# Patient Record
Sex: FEMALE | Race: WHITE | NOT HISPANIC OR LATINO | Employment: OTHER | URBAN - METROPOLITAN AREA
[De-identification: names, ages, dates, MRNs, and addresses within clinical notes are randomized per-mention and may not be internally consistent; named-entity substitution may affect disease eponyms.]

---

## 2017-01-04 ENCOUNTER — HOSPITAL ENCOUNTER (OUTPATIENT)
Dept: MAMMOGRAPHY | Facility: CLINIC | Age: 72
Discharge: HOME | End: 2017-01-04
Attending: OBSTETRICS & GYNECOLOGY | Admitting: OBSTETRICS & GYNECOLOGY
Payer: MEDICARE

## 2017-01-04 DIAGNOSIS — Z12.31 VISIT FOR SCREENING MAMMOGRAM: ICD-10-CM

## 2017-01-04 PROCEDURE — 77063 BREAST TOMOSYNTHESIS BI: CPT

## 2017-01-06 ENCOUNTER — THERAPY VISIT (OUTPATIENT)
Dept: PHYSICAL THERAPY | Facility: CLINIC | Age: 72
End: 2017-01-06
Payer: MEDICARE

## 2017-01-06 DIAGNOSIS — M25.561 ACUTE PAIN OF RIGHT KNEE: Primary | ICD-10-CM

## 2017-01-06 DIAGNOSIS — Z47.89 ORTHOPEDIC AFTERCARE: ICD-10-CM

## 2017-01-06 PROCEDURE — 97140 MANUAL THERAPY 1/> REGIONS: CPT | Mod: GP | Performed by: PHYSICAL THERAPIST

## 2017-01-06 PROCEDURE — 97110 THERAPEUTIC EXERCISES: CPT | Mod: GP | Performed by: PHYSICAL THERAPIST

## 2017-01-13 ENCOUNTER — THERAPY VISIT (OUTPATIENT)
Dept: PHYSICAL THERAPY | Facility: CLINIC | Age: 72
End: 2017-01-13
Payer: MEDICARE

## 2017-01-13 DIAGNOSIS — M25.561 ACUTE PAIN OF RIGHT KNEE: Primary | ICD-10-CM

## 2017-01-13 DIAGNOSIS — Z47.89 ORTHOPEDIC AFTERCARE: ICD-10-CM

## 2017-01-13 PROCEDURE — 97140 MANUAL THERAPY 1/> REGIONS: CPT | Mod: GP | Performed by: PHYSICAL THERAPIST

## 2017-01-13 PROCEDURE — 97110 THERAPEUTIC EXERCISES: CPT | Mod: GP | Performed by: PHYSICAL THERAPIST

## 2017-01-13 NOTE — PROGRESS NOTES
Subjective:    HPI                    Objective:    System    Physical Exam    General     ROS    Assessment/Plan:      PROGRESS  REPORT    Progress reporting period is from 11/29/16 to 1/13/16.       SUBJECTIVE  Subjective changes noted by patient:  .  Subjective: Underwent doppler post last tx that was (-).  Is having less pain in anterior knee and has been able to transfer to [facility] more during gait.      Current pain level is   .     Previous pain level was   Initial Pain level: 2/10.   Changes in function:  Yes (See Goal flowsheet attached for changes in current functional level)  Adverse reaction to treatment or activity: None    OBJECTIVE  Changes noted in objective findings:    Objective: PROM:  3/0/125.  Working now more on strength-utilizing CKC ex with precautions to avoid irritation of LB.       ASSESSMENT/PLAN  Updated problem list and treatment plan: Diagnosis 1:  R TKA  Pain -  manual therapy, self management, education and home program  Decreased ROM/flexibility - manual therapy and therapeutic exercise  Decreased joint mobility - manual therapy and therapeutic exercise  Decreased strength - therapeutic exercise and therapeutic activities  Impaired gait - gait training  Impaired muscle performance - neuro re-education  Decreased function - therapeutic activities  STG/LTGs have been met or progress has been made towards goals:  Yes (See Goal flow sheet completed today.)  Assessment of Progress: The patient's condition is improving.  Self Management Plans:  Patient has been instructed in a home treatment program.  Patient  has been instructed in self management of symptoms.    Rebecca continues to require the following intervention to meet STG and LTG's:  PT    Recommendations:  This patient would benefit from continued therapy.     Frequency:  1 X week, once daily  Duration:  for 7 weeks        Please refer to the daily flowsheet for treatment today, total treatment time and time spent performing 1:1  timed codes.

## 2017-01-13 NOTE — Clinical Note
[facility] PHYSICAL [facility]  [address]  [facility] [address]  [phone]    2017  Re: Rebecca Prieto   :   1945  MRN:  [id number]   REFERRING PHYSICIAN:   Jean Wallace    [facility] PHYSICAL [facility]    Date of Initial Evaluation:  2016  Visits: 11 Rxs Used: 11  Reason for Referral:     Acute pain of right knee  Orthopedic aftercare    PROGRESS  REPORT    Progress reporting period is from 16 to 17.       SUBJECTIVE  Subjective changes noted by patient:  .  Subjective: Underwent doppler post last tx that was (-).  Is having less pain in anterior knee and has been able to transfer to [facility] more during gait.      Current pain level is   .     Previous pain level was   Initial Pain level: 2/10.   Changes in function:  Yes (See Goal flowsheet attached for changes in current functional level)  Adverse reaction to treatment or activity: None    OBJECTIVE  Changes noted in objective findings:    Objective: PROM:  3/0/125.  Working now more on strength-utilizing CKC ex with precautions to avoid irritation of LB.       ASSESSMENT/PLAN  Updated problem list and treatment plan: Diagnosis 1:  R TKA  Pain -  manual therapy, self management, education and home program  Decreased ROM/flexibility - manual therapy and therapeutic exercise  Decreased joint mobility - manual therapy and therapeutic exercise  Decreased strength - therapeutic exercise and therapeutic activities  Impaired gait - gait training  Impaired muscle performance - neuro re-education  Decreased function - therapeutic activities  STG/LTGs have been met or progress has been made towards goals:  Yes (See Goal flow sheet completed today.)  Assessment of Progress: The patient's condition is improving.  Self Management Plans:  Patient has been instructed in a home treatment program.    Re: Rebecca Prieto   :   1945    Patient  has been  instructed in self management of symptoms.  Rebecca continues to require the following intervention to meet STG and LTG's:  PT    Recommendations:  This patient would benefit from continued therapy.     Frequency:  1 X week, once daily  Duration:  for 7 weeks        Thank you for your referral.    INQUIRIES  Therapist: Candy Forbes, PT, ScD, [facility]   [facility]  [address] [place] [address] [address] [address]  Phone: [phone]  Fax: [phone]

## 2017-01-20 ENCOUNTER — THERAPY VISIT (OUTPATIENT)
Dept: PHYSICAL THERAPY | Facility: CLINIC | Age: 72
End: 2017-01-20
Payer: MEDICARE

## 2017-01-20 DIAGNOSIS — M25.561 ACUTE PAIN OF RIGHT KNEE: Primary | ICD-10-CM

## 2017-01-20 DIAGNOSIS — Z47.89 ORTHOPEDIC AFTERCARE: ICD-10-CM

## 2017-01-20 PROCEDURE — 97112 NEUROMUSCULAR REEDUCATION: CPT | Mod: GP | Performed by: PHYSICAL THERAPIST

## 2017-01-20 PROCEDURE — 97110 THERAPEUTIC EXERCISES: CPT | Mod: GP | Performed by: PHYSICAL THERAPIST

## 2017-01-26 ENCOUNTER — DOCUMENTATION ONLY (OUTPATIENT)
Dept: LAB | Facility: CLINIC | Age: 72
End: 2017-01-26

## 2017-01-26 DIAGNOSIS — E55.9 VITAMIN D DEFICIENCY: ICD-10-CM

## 2017-01-26 DIAGNOSIS — Z00.00 ROUTINE GENERAL MEDICAL EXAMINATION AT A HEALTH CARE FACILITY: Primary | ICD-10-CM

## 2017-01-26 DIAGNOSIS — E78.5 HYPERLIPIDEMIA LDL GOAL <160: ICD-10-CM

## 2017-01-26 NOTE — PROGRESS NOTES
Please review, associate diagnosis, and sign pending pre physical lab orders for patient's upcoming 2/2/17 lab appointment.     Thank you,  Lab

## 2017-01-27 ENCOUNTER — THERAPY VISIT (OUTPATIENT)
Dept: PHYSICAL THERAPY | Facility: CLINIC | Age: 72
End: 2017-01-27
Payer: MEDICARE

## 2017-01-27 DIAGNOSIS — Z47.89 ORTHOPEDIC AFTERCARE: ICD-10-CM

## 2017-01-27 DIAGNOSIS — M25.561 ACUTE PAIN OF RIGHT KNEE: Primary | ICD-10-CM

## 2017-01-27 PROCEDURE — 97112 NEUROMUSCULAR REEDUCATION: CPT | Mod: GP | Performed by: PHYSICAL THERAPIST

## 2017-01-27 PROCEDURE — 97110 THERAPEUTIC EXERCISES: CPT | Mod: GP | Performed by: PHYSICAL THERAPIST

## 2017-02-02 DIAGNOSIS — Z00.00 ROUTINE GENERAL MEDICAL EXAMINATION AT A HEALTH CARE FACILITY: ICD-10-CM

## 2017-02-02 DIAGNOSIS — E55.9 VITAMIN D DEFICIENCY: ICD-10-CM

## 2017-02-02 DIAGNOSIS — E78.5 HYPERLIPIDEMIA LDL GOAL <160: ICD-10-CM

## 2017-02-02 LAB
ALBUMIN SERPL-MCNC: 3.7 G/DL (ref 3.4–5)
ALP SERPL-CCNC: 57 U/L (ref 40–150)
ALT SERPL W P-5'-P-CCNC: 19 U/L (ref 0–50)
ANION GAP SERPL CALCULATED.3IONS-SCNC: 7 MMOL/L (ref 3–14)
AST SERPL W P-5'-P-CCNC: 11 U/L (ref 0–45)
BILIRUB SERPL-MCNC: 0.4 MG/DL (ref 0.2–1.3)
BUN SERPL-MCNC: 18 MG/DL (ref 7–30)
CALCIUM SERPL-MCNC: 9 MG/DL (ref 8.5–10.1)
CHLORIDE SERPL-SCNC: 104 MMOL/L (ref 94–109)
CHOLEST SERPL-MCNC: 240 MG/DL
CO2 SERPL-SCNC: 30 MMOL/L (ref 20–32)
CREAT SERPL-MCNC: 0.71 MG/DL (ref 0.52–1.04)
DEPRECATED CALCIDIOL+CALCIFEROL SERPL-MC: 30 UG/L (ref 20–75)
ERYTHROCYTE [DISTWIDTH] IN BLOOD BY AUTOMATED COUNT: 13.8 % (ref 10–15)
GFR SERPL CREATININE-BSD FRML MDRD: 82 ML/MIN/1.7M2
GLUCOSE SERPL-MCNC: 87 MG/DL (ref 70–99)
HCT VFR BLD AUTO: 38.5 % (ref 35–47)
HCV AB SERPL QL IA: NORMAL
HDLC SERPL-MCNC: 80 MG/DL
HGB BLD-MCNC: 12 G/DL (ref 11.7–15.7)
LDLC SERPL CALC-MCNC: 149 MG/DL
MCH RBC QN AUTO: 28.2 PG (ref 26.5–33)
MCHC RBC AUTO-ENTMCNC: 31.2 G/DL (ref 31.5–36.5)
MCV RBC AUTO: 90 FL (ref 78–100)
NONHDLC SERPL-MCNC: 160 MG/DL
PLATELET # BLD AUTO: 335 10E9/L (ref 150–450)
POTASSIUM SERPL-SCNC: 4 MMOL/L (ref 3.4–5.3)
PROT SERPL-MCNC: 6.7 G/DL (ref 6.8–8.8)
RBC # BLD AUTO: 4.26 10E12/L (ref 3.8–5.2)
SODIUM SERPL-SCNC: 141 MMOL/L (ref 133–144)
TRIGL SERPL-MCNC: 56 MG/DL
WBC # BLD AUTO: 4.7 10E9/L (ref 4–11)

## 2017-02-02 PROCEDURE — 85027 COMPLETE CBC AUTOMATED: CPT | Performed by: INTERNAL MEDICINE

## 2017-02-02 PROCEDURE — 36415 COLL VENOUS BLD VENIPUNCTURE: CPT | Performed by: INTERNAL MEDICINE

## 2017-02-02 PROCEDURE — 80061 LIPID PANEL: CPT | Performed by: INTERNAL MEDICINE

## 2017-02-02 PROCEDURE — 82306 VITAMIN D 25 HYDROXY: CPT | Performed by: INTERNAL MEDICINE

## 2017-02-02 PROCEDURE — 80053 COMPREHEN METABOLIC PANEL: CPT | Performed by: INTERNAL MEDICINE

## 2017-02-02 PROCEDURE — 86803 HEPATITIS C AB TEST: CPT | Performed by: INTERNAL MEDICINE

## 2017-02-02 NOTE — Clinical Note
"[facility]  [address] [place], [place] [address]  [address]  [place]  [address]  Tel: [phone]    February 3, 2017    Rebecca R Ida  [facility] [facility]  [place] [address]        Dear Ms. Prieto,    The following letter pertains to your most recent diagnostic tests:    -Your hepatitis C screening test is negative.  You do not have hepatitis C.     -Your vitamin D level is normal.    -Your total cholesterol is 240 which is above your goal of total cholesterol less than 200.  This is because you have very high good cholesterol HDL.      -Your triglycerides are 56 which are at your goal of triglycerides less than 150.    -Your HDL or \"good cholesterol\" is 80 which is at your goal of HDL cholesterol greater than 40.    -Your LDL cholesterol or \"bad cholesterol\" is 149 which is at your goal of LDL cholesterol less than <160.  Your LDL goal is based on your risk factors for artery disease.     -Liver and gallbladder tests are normal for you. (ALT,AST, Alk phos, bilirubin), kidney function is normal for you (Creatinine, GFR), Sodium is normal, Potassium is normal for you, Calcium is normal for you, Glucose (blood sugar) is normal for you.      -Your complete blood counts including your hemoglobin returned normal for you.         Bottom line:  Your lab test results look healthy and at goal.        Follow up:  As previously planned with Dr. Jones       The 10-year ASCVD risk score (Brittmonet COTTON Jr., et al., 2013) is: 6.1%    Values used to calculate the score:      Age: 72 years      Sex: Female      Is an : No      Diabetic: No      Tobacco smoker: No      Systolic Blood Pressure: 102 mmHg      Prescribed Antihypertensives: No      HDL Cholesterol: 80 mg/dL      Total Cholesterol: 240 mg/dL    If you have any further questions or problems, please contact our office.      Sincerely,    González Hurtado MD/sohail    Results for orders placed or performed in visit on 02/02/17   Hepatitis C antibody   Result " Value Ref Range    Hepatitis C Antibody  NR     Nonreactive   Assay performance characteristics have not been established for newborns,   infants, and children     Lipid Profile with reflex to direct LDL   Result Value Ref Range    Cholesterol 240 (H) <200 mg/dL    Triglycerides 56 <150 mg/dL    HDL Cholesterol 80 >49 mg/dL    LDL Cholesterol Calculated 149 (H) <100 mg/dL    Non HDL Cholesterol 160 (H) <130 mg/dL   CBC with platelets   Result Value Ref Range    WBC 4.7 4.0 - 11.0 10e9/L    RBC Count 4.26 3.8 - 5.2 10e12/L    Hemoglobin 12.0 11.7 - 15.7 g/dL    Hematocrit 38.5 35.0 - 47.0 %    MCV 90 78 - 100 fl    MCH 28.2 26.5 - 33.0 pg    MCHC 31.2 (L) 31.5 - 36.5 g/dL    RDW 13.8 10.0 - 15.0 %    Platelet Count 335 150 - 450 10e9/L   Comprehensive metabolic panel   Result Value Ref Range    Sodium 141 133 - 144 mmol/L    Potassium 4.0 3.4 - 5.3 mmol/L    Chloride 104 94 - 109 mmol/L    Carbon Dioxide 30 20 - 32 mmol/L    Anion Gap 7 3 - 14 mmol/L    Glucose 87 70 - 99 mg/dL    Urea Nitrogen 18 7 - 30 mg/dL    Creatinine 0.71 0.52 - 1.04 mg/dL    GFR Estimate 82 >60 mL/min/1.7m2    GFR Estimate If Black >90   GFR Calc   >60 mL/min/1.7m2    Calcium 9.0 8.5 - 10.1 mg/dL    Bilirubin Total 0.4 0.2 - 1.3 mg/dL    Albumin 3.7 3.4 - 5.0 g/dL    Protein Total 6.7 (L) 6.8 - 8.8 g/dL    Alkaline Phosphatase 57 40 - 150 U/L    ALT 19 0 - 50 U/L    AST 11 0 - 45 U/L   Vitamin D Deficiency   Result Value Ref Range    Vitamin D Deficiency screening 30 20 - 75 ug/L           Enclosure: Lab Results

## 2017-02-03 ENCOUNTER — THERAPY VISIT (OUTPATIENT)
Dept: PHYSICAL THERAPY | Facility: CLINIC | Age: 72
End: 2017-02-03
Payer: MEDICARE

## 2017-02-03 DIAGNOSIS — M25.561 ACUTE PAIN OF RIGHT KNEE: Primary | ICD-10-CM

## 2017-02-03 DIAGNOSIS — Z47.89 ORTHOPEDIC AFTERCARE: ICD-10-CM

## 2017-02-03 PROCEDURE — 97112 NEUROMUSCULAR REEDUCATION: CPT | Mod: GP | Performed by: PHYSICAL THERAPIST

## 2017-02-03 PROCEDURE — 97110 THERAPEUTIC EXERCISES: CPT | Mod: GP | Performed by: PHYSICAL THERAPIST

## 2017-02-03 NOTE — PROGRESS NOTES
"Quick Note:    The following letter pertains to your most recent diagnostic tests:    -Your hepatitis C screening test is negative. You do not have hepatitis C.     -Your vitamin D level is normal.    -Your total cholesterol is 240 which is above your goal of total cholesterol less than 200. This is because you have very high good cholesterol HDL.     -Your triglycerides are 56 which are at your goal of triglycerides less than 150.    -Your HDL or \"good cholesterol\" is 80 which is at your goal of HDL cholesterol greater than 40.    -Your LDL cholesterol or \"bad cholesterol\" is 149 which is at your goal of LDL cholesterol less than <160. Your LDL goal is based on your risk factors for artery disease.    -Liver and gallbladder tests are normal for you. (ALT,AST, Alk phos, bilirubin), kidney function is normal for you (Creatinine, GFR), Sodium is normal, Potassium is normal for you, Calcium is normal for you, Glucose (blood sugar) is normal for you.     -Your complete blood counts including your hemoglobin returned normal for you.       Bottom line: Your lab test results look healthy and at goal.       Follow up: As previously planned with Dr. Jones       Sincerely,    Dr. Hurtado    The 10-year ASCVD risk score (Britt COTTON Jr., et al., 2013) is: 6.1%   Values used to calculate the score:   Age: 72 years   Sex: Female   Is an : No   Diabetic: No   Tobacco smoker: No   Systolic Blood Pressure: 102 mmHg   Prescribed Antihypertensives: No   HDL Cholesterol: 80 mg/dL   Total Cholesterol: 240 mg/dL    ______  "

## 2017-02-06 ENCOUNTER — OFFICE VISIT (OUTPATIENT)
Dept: FAMILY MEDICINE | Facility: CLINIC | Age: 72
End: 2017-02-06
Payer: MEDICARE

## 2017-02-06 VITALS
TEMPERATURE: 97.6 F | OXYGEN SATURATION: 99 % | WEIGHT: 149.7 LBS | BODY MASS INDEX: 23.49 KG/M2 | SYSTOLIC BLOOD PRESSURE: 119 MMHG | HEIGHT: 67 IN | HEART RATE: 56 BPM | DIASTOLIC BLOOD PRESSURE: 73 MMHG

## 2017-02-06 DIAGNOSIS — E55.9 VITAMIN D DEFICIENCY: ICD-10-CM

## 2017-02-06 DIAGNOSIS — M48.00 SPINAL STENOSIS, UNSPECIFIED SPINAL REGION: ICD-10-CM

## 2017-02-06 DIAGNOSIS — D12.6 ADENOMATOUS POLYP OF COLON: ICD-10-CM

## 2017-02-06 DIAGNOSIS — G43.809 OTHER TYPE OF MIGRAINE WITHOUT STATUS MIGRAINOSUS: ICD-10-CM

## 2017-02-06 DIAGNOSIS — E78.5 HYPERLIPIDEMIA LDL GOAL <160: ICD-10-CM

## 2017-02-06 DIAGNOSIS — M85.80 OSTEOPENIA: ICD-10-CM

## 2017-02-06 DIAGNOSIS — Z00.00 ROUTINE GENERAL MEDICAL EXAMINATION AT A HEALTH CARE FACILITY: Primary | ICD-10-CM

## 2017-02-06 PROCEDURE — G0439 PPPS, SUBSEQ VISIT: HCPCS | Performed by: INTERNAL MEDICINE

## 2017-02-06 RX ORDER — SUMATRIPTAN 50 MG/1
50 TABLET, FILM COATED ORAL PRN
Qty: 18 TABLET | Refills: 3 | Status: CANCELLED | OUTPATIENT
Start: 2017-02-06

## 2017-02-06 RX ORDER — GABAPENTIN 300 MG/1
300 CAPSULE ORAL 3 TIMES DAILY
Qty: 270 CAPSULE | Refills: 3 | Status: SHIPPED | OUTPATIENT
Start: 2017-02-06 | End: 2018-04-02

## 2017-02-06 RX ORDER — SUMATRIPTAN 50 MG/1
50 TABLET, FILM COATED ORAL PRN
Qty: 18 TABLET | Refills: 3 | Status: SHIPPED | OUTPATIENT
Start: 2017-02-06 | End: 2018-05-08

## 2017-02-06 NOTE — MR AVS SNAPSHOT
After Visit Summary   2/6/2017    Rebecca Prieto    MRN: [id number]           Patient Information     Date Of Birth          1945        Visit Information        Provider Department      2/6/2017 10:30 AM Marco Jones MD [facility]        Today's Diagnoses     Routine general medical examination at a health care facility    -  1     Other type of migraine without status migrainosus         Spinal stenosis, unspecified spinal region         Hyperlipidemia LDL goal <160         Vitamin D deficiency         Osteopenia         Adenomatous polyp of colon         Nausea           Care Instructions      Preventive Health Recommendations    Female Ages 65 +    Yearly exam:     See your health care provider every year in order to  o Review health changes.   o Discuss preventive care.    o Review your medicines if your doctor has prescribed any.      You no longer need a yearly Pap test unless you've had an abnormal Pap test in the past 10 years. If you have vaginal symptoms, such as bleeding or discharge, be sure to talk with your provider about a Pap test.      Every 1 to 2 years, have a mammogram.  If you are over 69, talk with your health care provider about whether or not you want to continue having screening mammograms.      Every 10 years, have a colonoscopy. Or, have a yearly FIT test (stool test). These exams will check for colon cancer.       Have a cholesterol test every 5 years, or more often if your doctor advises it.       Have a diabetes test (fasting glucose) every three years. If you are at risk for diabetes, you should have this test more often.       At age 65, have a bone density scan (DEXA) to check for osteoporosis (brittle bone disease).    Shots:    Get a flu shot each year.    Get a tetanus shot every 10 years.    Talk to your doctor about your pneumonia vaccines. There are now two you should receive - Pneumovax (PPSV 23) and Prevnar (PCV 13).    Talk to your  doctor about the shingles vaccine.    Talk to your doctor about the hepatitis B vaccine.    Nutrition:     Eat at least 5 servings of fruits and vegetables each day.      Eat whole-grain bread, whole-wheat pasta and brown rice instead of white grains and rice.      Talk to your provider about Calcium and Vitamin D.     Lifestyle    Exercise at least 150 minutes a week (30 minutes a day, 5 days a week). This will help you control your weight and prevent disease.      Limit alcohol to one drink per day.      No smoking.       Wear sunscreen to prevent skin cancer.       See your dentist twice a year for an exam and cleaning.      See your eye doctor every 1 to 2 years to screen for conditions such as glaucoma, macular degeneration and cataracts.    The 10-year ASCVD risk score (Britt COTTON JrAnderson, et al., 2013) is: 8.1%    Values used to calculate the score:      Age: 72 years      Sex: Female      Is an : No      Diabetic: No      Tobacco smoker: No      Systolic Blood Pressure: 119 mmHg      Prescribed Antihypertensives: No      HDL Cholesterol: 80 mg/dL      Total Cholesterol: 240 mg/dL          Follow-ups after your visit        Your next 10 appointments already scheduled     Feb 10, 2017 10:40 AM   BISHNU Extremity with Candy Forbes PT   [facility] ([facility]  )    [address] [place] [address] [address]  [facility] [address] [phone]            Feb 16, 2017 10:00 AM   BISHNU Extremity with Candy Forbes PT   [facility] ([facility]  )    [address] [place] [address] [address]  [facility] [address] [phone]            Feb 23, 2017 10:00 AM   BISHNU Extremity with Candy Forbes PT   [facility] [place] [facility] ([facility]  )    [address] [place] [address] [address]  [facility] [address]   [phone]              Who to contact     If you have questions or need follow up information about today's clinic visit or  "your schedule please contact [facility] directly at [phone].  Normal or non-critical lab and imaging results will be communicated to you by MyChart, letter or phone within 4 business days after the clinic has received the results. If you do not hear from us within 7 days, please contact the clinic through MyChart or phone. If you have a critical or abnormal lab result, we will notify you by phone as soon as possible.  Submit refill requests through [employer] or call your pharmacy and they will forward the refill request to us. Please allow 3 business days for your refill to be completed.          Additional Information About Your Visit        Care EveryWhere ID     This is your Care EveryWhere ID. This could be used by other organizations to access your [facility] medical records  [id number]        Your Vitals Were     Pulse Temperature Height BMI (Body Mass Index) Pulse Oximetry Breastfeeding?    56 97.6  F (36.4  C) (Oral) 5' 7\" (1.702 m) 23.44 kg/m2 99% No       Blood Pressure from Last 3 Encounters:   02/06/17 119/73   12/08/16 102/68   11/29/16 105/66    Weight from Last 3 Encounters:   02/06/17 149 lb 11.2 oz (67.903 kg)   12/08/16 144 lb (65.318 kg)   11/29/16 147 lb (66.679 kg)              Today, you had the following     No orders found for display         Today's Medication Changes          These changes are accurate as of: 2/6/17 10:35 AM.  If you have any questions, ask your nurse or doctor.               Stop taking these medicines if you haven't already. Please contact your care team if you have questions.     ADVIL 200 MG tablet   Generic drug:  ibuprofen   Stopped by:  Marco Jones MD           aspirin 325 MG tablet   Stopped by:  Marco Jones MD           Magnesium Oxide 250 MG Tabs   Stopped by:  Marco Jones MD           ondansetron 4 MG tablet   Commonly known as:  ZOFRAN   Stopped by:  Marco Jones MD           prochlorperazine 5 MG tablet "   Commonly known as:  COMPAZINE   Stopped by:  Marco Jones MD           senna-docusate 8.6-50 MG per tablet   Commonly known as:  SENOKOT-S;PERICOLACE   Stopped by:  Marco Jones MD           TYLENOL PO   Stopped by:  Marco Jones MD                Where to get your medicines      These medications were sent to [facility] - [place], [place] - [address]  [address] [facility] [address]     Phone:  [phone]    - gabapentin 300 MG capsule  - SUMAtriptan 50 MG tablet             Primary Care Provider Office Phone # Fax #    Marco Jones [phone]       [facility] [address] [place] [address] [address]        Thank you!     Thank you for choosing [facility]  for your care. Our goal is always to provide you with excellent care. Hearing back from our patients is one way we can continue to improve our services. Please take a few minutes to complete the written survey that you may receive in the mail after your visit with us. Thank you!             Your Updated Medication List - Protect others around you: Learn how to safely use, store and throw away your medicines at www.disposemymeds.org.          This list is accurate as of: 2/6/17 10:35 AM.  Always use your most recent med list.                   Brand Name Dispense Instructions for use    CALCIUM + D PO      Take 1 capsule by mouth daily       conjugated estrogens cream    PREMARIN     Place 1 g vaginally twice a week       gabapentin 300 MG capsule    NEURONTIN    270 capsule    Take 1 capsule (300 mg) by mouth 3 times daily       SUMAtriptan 50 MG tablet    IMITREX    18 tablet    Take 1 tablet (50 mg) by mouth as needed for migraine

## 2017-02-06 NOTE — NURSING NOTE
"Chief Complaint   Patient presents with     Wellness Visit       Initial /73 mmHg  Pulse 56  Temp(Src) 97.6  F (36.4  C) (Oral)  Ht 5' 7\" (1.702 m)  Wt 149 lb 11.2 oz (67.903 kg)  BMI 23.44 kg/m2  SpO2 99%  Breastfeeding? No Estimated body mass index is 23.44 kg/(m^2) as calculated from the following:    Height as of this encounter: 5' 7\" (1.702 m).    Weight as of this encounter: 149 lb 11.2 oz (67.903 kg).  Medication Reconciliation: complete.  Jeanne Viramontes CMA    "

## 2017-02-06 NOTE — PROGRESS NOTES
SUBJECTIVE:                                                            Rebecca Prieto is a 72 year old female who presents for Preventive Visit.    The patient feels fine, is working out some but not a lot.  NO c/o on review of systems, seeing gyn yearly, dexa done via her.  On neurontin for chronic back pain due to spinal stenosis.  Migraines have been stable.                 Past Medical History:      Past Medical History   Diagnosis Date     Spinal stenosis      on neurontin     Spondylolistheses      Migraines      Osteopenia 2005     fu 2009 better, -0.4 spine and -1.1 fem neck, has fu with gyn     Tonsillar abscess 1992     Adenomatous polyp of colon 2002     fu done 2011 and to fu 2014, fu done 2014 and to fu 2019     Screening 2005     abd us neg for aaa due to fh     Hypercholesteraemia      Vitamin D deficiency 2014     PONV (postoperative nausea and vomiting)              Past Surgical History:      Past Surgical History   Procedure Laterality Date     Strip vein  2010     Hysterectomy, pap no longer indicated  1998     due to hyperplasia     Right knee arthroscopy  1998     Breast biopsies       twice     Rotator cuff repair rt/lt  2007     Right knee replacement  2016     [employer]             Social History:     Social History     Social History     Marital Status:      Spouse Name: N/A     Number of Children: 3     Years of Education: N/A     Occupational History     Not on file.     Social History Main Topics     Smoking status: Never Smoker      Smokeless tobacco: Never Used     Alcohol Use: 4.2 oz/week     7 Standard drinks or equivalent per week     Drug Use: No     Sexual Activity:     Partners: Male     Other Topics Concern     Not on file     Social History Narrative             Family History:   reviewed         Allergies:     Allergies   Allergen Reactions     Nickel      Succinylcholine      Gets paralyzed             Medications:     Current Outpatient Prescriptions   Medication  "Sig Dispense Refill     gabapentin (NEURONTIN) 300 MG capsule Take 1 capsule (300 mg) by mouth 3 times daily 270 capsule 3     SUMAtriptan (IMITREX) 50 MG tablet Take 1 tablet (50 mg) by mouth as needed for migraine 18 tablet 3     conjugated estrogens (PREMARIN) vaginal cream Place 1 g vaginally twice a week       Calcium Carbonate-Vitamin D (CALCIUM + D PO) Take 1 capsule by mouth daily        [DISCONTINUED] gabapentin (NEURONTIN) 300 MG capsule Take 1 capsule (300 mg) by mouth 3 times daily 270 capsule 3               Review of Systems:   The 10 point Review of Systems is negative other than noted in the HPI           Physical Exam:   Blood pressure 119/73, pulse 56, temperature 97.6  F (36.4  C), temperature source Oral, height 5' 7\" (1.702 m), weight 149 lb 11.2 oz (67.903 kg), SpO2 99 %, not currently breastfeeding.    Exam:  Constitutional: healthy appearing, alert and in no distress  Heent: Normocephalic. Head without obvious masses or lesions. PERRLDC, EOMI. Mouth exam within normal limits: tongue, mucous membranes, posterior pharynx all normal, no lesions or abnormalities seen.  Tm's and canals within normal limits bilaterally. Neck supple, no nuchal rigidity or masses. No supraclavicular, or cervical adenopathy. Thyroid symmetric, no masses.  Cardiovascular: Regular rate and rhythm, no murmer, rub or gallops.  JVP not elevated, no edema.  Carotids within normal limits bilaterally, no bruits.  Respiratory: Normal respiratory effort.  Lungs clear, normal flow, no wheezing or crackles.  Gastrointestinal: Normal active bowel sounds.   Soft, not tender, no masses, guarding or rebound.  No hepatosplenomegaly.   Musculoskeletal: extremities normal, no gross deformities noted.  Skin: no suspicious lesions or rashes   Neurologic: Mental status within normal limits.  Speech fluent.  No gross motor abnormalities and gait intact.  Psychiatric: mentation appears normal and affect normal.         Data:   Labs reviewed " with patient         Assessment:   1. Normal cpx  2. Spinal stenosis, migraines, stable  3. Elevated cholesterol, 10 year risk under 10%, diet, exercise  4. Vit d defic, level fine  5. Colon polyp, up to date on follow up  6. hcm         Plan:   Up to date immunizations, colon, mammogram  Follow up gyn  Exercise, diet  Call if problems      Marco Jones M.D.                  Are you in the first 12 months of your Medicare Part B coverage?  No    Healthy Habits:    Do you get at least three servings of calcium containing foods daily (dairy, green leafy vegetables, etc.)? yes    Amount of exercise or daily activities, outside of work: 5 day(s) per week    Problems taking medications regularly No    Medication side effects: No    Have you had an eye exam in the past two years? yes    Do you see a dentist twice per year? yes    Do you have sleep apnea, excessive snoring or daytime drowsiness?no                All Histories reviewed and updated in [facility] as appropriate.  Social History   Substance Use Topics     Smoking status: Never Smoker      Smokeless tobacco: Never Used     Alcohol Use: 4.2 oz/week     7 Standard drinks or equivalent per week       The patient does not drink >3 drinks per day nor >7 drinks per week.    Today's PHQ-2 Score:   PHQ-2 ( 1999 Pf[employer]) 11/10/2016 2/4/2016   Q1: Little interest or pleasure in doing things 0 0   Q2: Feeling down, depressed or hopeless 0 0   PHQ-2 Score 0 0       Do you feel safe in your environment - Yes    Do you have a Health Care Directive?: No: Advance care planning reviewed with patient; information given to patient to review.    Current providers sharing in care for this patient include:   Patient Care Team:  Marco Jones MD as PCP - General (Internal Medicine)  Sandra Hector MD as MD (OB/Gyn)      Hearing impairment: No    Ability to successfully perform activities of daily living: Yes, no assistance needed     Fall risk:       Home safety:  none  "identified      The following health maintenance items are reviewed in Epic and correct as of today:  Health Maintenance   Topic Date Due     URINE DRUG SCREEN Q1 YR  02/01/1962     ADVANCE DIRECTIVE PLANNING Q5 YRS (NO INBASKET)  02/01/1965     DEXA SCAN SCREENING (SYSTEM ASSIGNED)  02/01/2012     FALL RISK ASSESSMENT  02/04/2017     INFLUENZA VACCINE (SYSTEM ASSIGNED)  09/01/2017     ELIER QUESTIONNAIRE 1 YEAR  11/29/2017     PHQ-9 Q1YR (NO INBASKET)  11/29/2017     MAMMO SCREEN Q2 YR (SYSTEM ASSIGNED)  01/04/2019     LIPID SCREEN Q5 YR FEMALE (SYSTEM ASSIGNED)  02/02/2022     TETANUS IMMUNIZATION (SYSTEM ASSIGNED)  10/11/2022     COLON CANCER SCREEN (SYSTEM ASSIGNED)  10/30/2024     PNEUMOCOCCAL  Completed     HEPATITIS C SCREENING  Completed            End of Life Planning:  Patient currently has an advanced directive: No.  I have verified the patient's ablity to prepare an advanced directive/make health care decisions.  Literature was provided to assist patient in preparing an advanced directive.    COUNSELING:  Reviewed preventive health counseling, as reflected in patient instructions       Regular exercise       Healthy diet/nutrition        Estimated body mass index is 22.55 kg/(m^2) as calculated from the following:    Height as of 12/8/16: 5' 7\" (1.702 m).    Weight as of 12/8/16: 144 lb (65.318 kg).     reports that she has never smoked. She has never used smokeless tobacco.      Appropriate preventive services were discussed with this patient, including applicable screening as appropriate for cardiovascular disease, diabetes, osteopenia/osteoporosis, and glaucoma.  As appropriate for age/gender, discussed screening for colorectal cancer, prostate cancer, breast cancer, and cervical cancer. Checklist reviewing preventive services available has been given to the patient.    Reviewed patients plan of care and provided an AVS. The Basic Care Plan (routine screening as documented in Health Maintenance) for " Rebecca meets the Care Plan requirement. This Care Plan has been established and reviewed with the Patient.    Counseling Resources:  ATP IV Guidelines  Pooled Cohorts Equation Calculator  Breast Cancer Risk Calculator  FRAX Risk Assessment  ICSI Preventive Guidelines  Dietary Guidelines for Americans, 2010  USDA's MyPlate  ASA Prophylaxis  Lung CA Screening    Marco Jones MD  [facility]

## 2017-02-06 NOTE — PATIENT INSTRUCTIONS

## 2017-02-10 ENCOUNTER — THERAPY VISIT (OUTPATIENT)
Dept: PHYSICAL THERAPY | Facility: CLINIC | Age: 72
End: 2017-02-10
Payer: MEDICARE

## 2017-02-10 DIAGNOSIS — M25.561 ACUTE PAIN OF RIGHT KNEE: Primary | ICD-10-CM

## 2017-02-10 PROCEDURE — 97112 NEUROMUSCULAR REEDUCATION: CPT | Mod: GP | Performed by: PHYSICAL THERAPIST

## 2017-02-10 PROCEDURE — 97110 THERAPEUTIC EXERCISES: CPT | Mod: GP | Performed by: PHYSICAL THERAPIST

## 2017-02-16 ENCOUNTER — THERAPY VISIT (OUTPATIENT)
Dept: PHYSICAL THERAPY | Facility: CLINIC | Age: 72
End: 2017-02-16
Payer: MEDICARE

## 2017-02-16 DIAGNOSIS — M25.561 ACUTE PAIN OF RIGHT KNEE: ICD-10-CM

## 2017-02-16 PROCEDURE — G8979 MOBILITY GOAL STATUS: HCPCS | Mod: GP | Performed by: PHYSICAL THERAPIST

## 2017-02-16 PROCEDURE — 97112 NEUROMUSCULAR REEDUCATION: CPT | Mod: GP | Performed by: PHYSICAL THERAPIST

## 2017-02-16 PROCEDURE — 97110 THERAPEUTIC EXERCISES: CPT | Mod: GP | Performed by: PHYSICAL THERAPIST

## 2017-02-16 PROCEDURE — G8978 MOBILITY CURRENT STATUS: HCPCS | Mod: GP | Performed by: PHYSICAL THERAPIST

## 2017-02-16 NOTE — LETTER
DEPARTMENT OF HEALTH AND HUMAN SERVICES  CENTERS FOR MEDICARE & MEDICAID SERVICES    PLAN/UPDATED PLAN OF PROGRESS FOR OUTPATIENT REHABILITATION  PATIENTS NAME:  Rebecca Prieto   : 1945  PROVIDER NUMBER:    [id number]  [id number]  [facility]ER NAME[facility]YSICAL THERAPY  MEDICAL RECORD NUMBER: [id number]   START OF CARE DATE:  SOC Date: 16   TYPE:  PT  PRIMARY/TREATMENT DIAGNOSIS: (Pertinent Medical Diagnosis)  Acute pain of right knee  VISITS FROM START OF CARE:  Rxs Used: 16   PROGRESS  REPORT  Progress reporting period is from 17 to 17.     SUBJECTIVE  Subjective changes noted by patient:  .  Subjective: Pt states she was able to walk for 30-40 mns on level ground without difficulty. Still having difficulty walking where there is a slight decline. Stair negotiation going down steps also most limited.      Current pain level is   .     Previous pain level was   Initial Pain level: 2/10.   Changes in function:  Yes (See Goal flowsheet attached for changes in current functional level)  Adverse reaction to treatment or activity: None  OBJECTIVE  Changes noted in objective findings:    Objective: Good gains in function and quality of gait.  Main limitation is eccentric strength and control.  PROM-0/0/125.  Able to flex actively to 117 easily.     ASSESSMENT/PLAN  Updated problem list and treatment plan: Diagnosis 1:  S/P Right TKA  Pain -  manual therapy, self management, education and home program  Decreased ROM/flexibility - manual therapy and therapeutic exercise  Decreased strength - therapeutic exercise and therapeutic activities  Impaired balance - neuro re-education and therapeutic activities  Impaired gait - gait training  Impaired muscle performance - neuro re-education  Decreased function - therapeutic activities  STG/LTGs have been met or progress has been made towards goals:  Yes (See Goal flow sheet completed today.)  Assessment of Progress:  "The patient's condition is improving.  Self Management Plans:  Patient has been instructed in a home treatment program.  Patient  has been instructed in self management of symptoms.  Rebecca continues to require the following intervention to meet STG and LTG's:  PT  Recommendations:  This patient would benefit from continued therapy.     Frequency:  1 X week, once daily  Duration:  for 3 weeks    Caregiver Signature/Credentials _____________________________ Date ________       Treating Provider: Candy Forbes PT, ScD, MOMT  PATIENTS NAME:  Rebecca Prieto   : 1945    I have reviewed and certified the need for these services and plan of treatment while under my care.        PHYSICIAN'S SIGNATURE:   _____________________________________  Date___________     Jean Wallace    Certification period:  Beginning of Cert date period: 17 to  End of Cert period date: 17     Functional Level Progress Report: Please see attached \"Goal Flow sheet for Functional level.\"    ____X____ Continue Services or       ________ DC Services                Service dates: From  SOC Date: 16 date to present                         "

## 2017-02-16 NOTE — PROGRESS NOTES
Subjective:    HPI                    Objective:    System    Physical Exam    General     ROS    Assessment/Plan:      PROGRESS  REPORT    Progress reporting period is from 1/13/17 to 2/16/17.       SUBJECTIVE  Subjective changes noted by patient:  .  Subjective: Pt states she was able to walk for 30-40 mns on level ground without difficulty. Still having difficulty walking where there is a slight decline. Stair negotiation going down steps also most limited.      Current pain level is   .     Previous pain level was   Initial Pain level: 2/10.   Changes in function:  Yes (See Goal flowsheet attached for changes in current functional level)  Adverse reaction to treatment or activity: None    OBJECTIVE  Changes noted in objective findings:    Objective: Good gains in function and quality of gait.  Main limitation is eccentric strength and control.  PROM-0/0/125.  Able to flex actively to 117 easily.       ASSESSMENT/PLAN  Updated problem list and treatment plan: Diagnosis 1:  S/P Right TKA  Pain -  manual therapy, self management, education and home program  Decreased ROM/flexibility - manual therapy and therapeutic exercise  Decreased strength - therapeutic exercise and therapeutic activities  Impaired balance - neuro re-education and therapeutic activities  Impaired gait - gait training  Impaired muscle performance - neuro re-education  Decreased function - therapeutic activities  STG/LTGs have been met or progress has been made towards goals:  Yes (See Goal flow sheet completed today.)  Assessment of Progress: The patient's condition is improving.  Self Management Plans:  Patient has been instructed in a home treatment program.  Patient  has been instructed in self management of symptoms.    Rebecca continues to require the following intervention to meet STG and LTG's:  PT    Recommendations:  This patient would benefit from continued therapy.     Frequency:  1 X week, once daily  Duration:  for 3  weeks        Please refer to the daily flowsheet for treatment today, total treatment time and time spent performing 1:1 timed codes.

## 2017-02-23 ENCOUNTER — THERAPY VISIT (OUTPATIENT)
Dept: PHYSICAL THERAPY | Facility: CLINIC | Age: 72
End: 2017-02-23
Payer: MEDICARE

## 2017-02-23 DIAGNOSIS — M25.561 ACUTE PAIN OF RIGHT KNEE: ICD-10-CM

## 2017-02-23 PROCEDURE — 97110 THERAPEUTIC EXERCISES: CPT | Mod: GP | Performed by: PHYSICAL THERAPIST

## 2017-02-23 PROCEDURE — 97112 NEUROMUSCULAR REEDUCATION: CPT | Mod: GP | Performed by: PHYSICAL THERAPIST

## 2017-03-03 ENCOUNTER — THERAPY VISIT (OUTPATIENT)
Dept: PHYSICAL THERAPY | Facility: CLINIC | Age: 72
End: 2017-03-03
Payer: MEDICARE

## 2017-03-03 DIAGNOSIS — M25.561 ACUTE PAIN OF RIGHT KNEE: ICD-10-CM

## 2017-03-03 PROCEDURE — 97110 THERAPEUTIC EXERCISES: CPT | Mod: GP | Performed by: PHYSICAL THERAPIST

## 2017-03-03 PROCEDURE — 97112 NEUROMUSCULAR REEDUCATION: CPT | Mod: GP | Performed by: PHYSICAL THERAPIST

## 2017-03-10 ENCOUNTER — THERAPY VISIT (OUTPATIENT)
Dept: PHYSICAL THERAPY | Facility: CLINIC | Age: 72
End: 2017-03-10
Payer: MEDICARE

## 2017-03-10 DIAGNOSIS — M25.561 ACUTE PAIN OF RIGHT KNEE: ICD-10-CM

## 2017-03-10 PROCEDURE — 97110 THERAPEUTIC EXERCISES: CPT | Mod: GP | Performed by: PHYSICAL THERAPIST

## 2017-03-10 PROCEDURE — 97530 THERAPEUTIC ACTIVITIES: CPT | Mod: GP | Performed by: PHYSICAL THERAPIST

## 2017-03-17 ENCOUNTER — THERAPY VISIT (OUTPATIENT)
Dept: PHYSICAL THERAPY | Facility: CLINIC | Age: 72
End: 2017-03-17
Payer: MEDICARE

## 2017-03-17 DIAGNOSIS — M25.561 ACUTE PAIN OF RIGHT KNEE: ICD-10-CM

## 2017-03-17 PROCEDURE — 97530 THERAPEUTIC ACTIVITIES: CPT | Mod: GP | Performed by: PHYSICAL THERAPIST

## 2017-03-17 PROCEDURE — 97110 THERAPEUTIC EXERCISES: CPT | Mod: GP | Performed by: PHYSICAL THERAPIST

## 2017-03-24 ENCOUNTER — THERAPY VISIT (OUTPATIENT)
Dept: PHYSICAL THERAPY | Facility: CLINIC | Age: 72
End: 2017-03-24
Payer: MEDICARE

## 2017-03-24 DIAGNOSIS — M25.561 ACUTE PAIN OF RIGHT KNEE: ICD-10-CM

## 2017-03-24 PROCEDURE — 97112 NEUROMUSCULAR REEDUCATION: CPT | Mod: GP | Performed by: PHYSICAL THERAPIST

## 2017-03-24 PROCEDURE — G8979 MOBILITY GOAL STATUS: HCPCS | Mod: GP | Performed by: PHYSICAL THERAPIST

## 2017-03-24 PROCEDURE — 97110 THERAPEUTIC EXERCISES: CPT | Mod: GP | Performed by: PHYSICAL THERAPIST

## 2017-03-24 PROCEDURE — G8978 MOBILITY CURRENT STATUS: HCPCS | Mod: GP | Performed by: PHYSICAL THERAPIST

## 2017-03-24 ASSESSMENT — ACTIVITIES OF DAILY LIVING (ADL)
LIMPING: I DO NOT HAVE THE SYMPTOM
SIT WITH YOUR KNEE BENT: ACTIVITY IS NOT DIFFICULT
STIFFNESS: I HAVE THE SYMPTOM BUT IT DOES NOT AFFECT MY ACTIVITY
HOW_WOULD_YOU_RATE_THE_CURRENT_FUNCTION_OF_YOUR_KNEE_DURING_YOUR_USUAL_DAILY_ACTIVITIES_ON_A_SCALE_FROM_0_TO_100_WITH_100_BEING_YOUR_LEVEL_OF_KNEE_FUNCTION_PRIOR_TO_YOUR_INJURY_AND_0_BEING_THE_INABILITY_TO_PERFORM_ANY_OF_YOUR_USUAL_DAILY_ACTIVITIES?: 90
RISE FROM A CHAIR: ACTIVITY IS NOT DIFFICULT
SWELLING: I DO NOT HAVE THE SYMPTOM
PAIN: I DO NOT HAVE THE SYMPTOM
STAND: ACTIVITY IS NOT DIFFICULT
HOW_WOULD_YOU_RATE_THE_OVERALL_FUNCTION_OF_YOUR_KNEE_DURING_YOUR_USUAL_DAILY_ACTIVITIES?: NEARLY NORMAL
KNEE_ACTIVITY_OF_DAILY_LIVING_SUM: 62
SQUAT: ACTIVITY IS NOT DIFFICULT
GIVING WAY, BUCKLING OR SHIFTING OF KNEE: I DO NOT HAVE THE SYMPTOM
WEAKNESS: I HAVE THE SYMPTOM BUT IT DOES NOT AFFECT MY ACTIVITY
KNEEL ON THE FRONT OF YOUR KNEE: NOT ANSWERED
RAW_SCORE: 66.77
GO DOWN STAIRS: ACTIVITY IS MINIMALLY DIFFICULT
WALK: ACTIVITY IS NOT DIFFICULT
GO UP STAIRS: ACTIVITY IS NOT DIFFICULT
AS_A_RESULT_OF_YOUR_KNEE_INJURY,_HOW_WOULD_YOU_RATE_YOUR_CURRENT_LEVEL_OF_DAILY_ACTIVITY?: NORMAL
KNEE_ACTIVITY_OF_DAILY_LIVING_SCORE: 95.39

## 2017-03-24 NOTE — LETTER
[facility] PHYSICAL [facility]  [address]  [facility] [address]  [phone]    2017  Re: Rebecca Prieto   :   1945  MRN:  [id number]   REFERRING PHYSICIAN:   Jean Wallace    [facility] PHYSICAL [facility]  Date of Initial Evaluation:  2017  Visits: 21 Rxs Used: 21  Reason for Referral:  Acute pain of right knee    PROGRESS  REPORT  Progress reporting period is from 17 to 3/24/17.       SUBJECTIVE  Subjective changes noted by patient:  .  Subjective: Pt reports tolerating allex well. Has questions re:  HEP and ex she should do at the club.  Has seen surgeon and was told she didn't need to see him until 1 ys post.      Current pain level is  Current Pain level: 0/10.     Previous pain level was   Initial Pain level: 2/10.   Changes in function:  Yes (See Goal flowsheet attached for changes in current functional level)  Adverse reaction to treatment or activity: None    OBJECTIVE  Changes noted in objective findings:    Objective: PROM of R knee=0/0/125.  Minimal to no swelling.  Gait is approaching NL.  Lifting 7# with quad, tolerating all CKC ex well.  Given ex program for home and club.  Corrected mechanics with going down stairs.  Pt will be going to [place] for several weeks.  If any problems develop, she is to check in with therapist.  Otherwise will be DCed to [facility]. Knee Activity of Daily Living Score: 95.39        ASSESSMENT/PLAN  Updated problem list and treatment plan: Diagnosis 1:  S/P Right TKA  Decreased ROM/flexibility - manual therapy and therapeutic exercise  Decreased strength - therapeutic exercise and therapeutic activities  Impaired balance - neuro re-education and therapeutic activities  Impaired muscle performance - neuro re-education  Decreased function - therapeutic activities  STG/LTGs have been met or progress has been made towards goals:  Yes (See Goal flow sheet completed  today.)  Assessment of Progress: Patient is meeting short term goals and is progressing towards long term goals.  Self Management Plans:  Patient is independent in a home treatment program.  Patient is independent in self management of symptoms.    Re: Rebecca Prieto   :   1945    Rebecca continues to require the following intervention to meet STG and LTG's:  PT    Recommendations:  Pt will be in [place] over the next several weeks.  If needed, will check in with PT upon her return.            Thank you for your referral.    INQUIRIES  Therapist: Candy Forbes, PT, ScD, [facility]  [facility] [place] [facility]  [address] [place] [address] [id number] [address]  Phone: [phone]  Fax: [phone]

## 2017-03-24 NOTE — PROGRESS NOTES
Subjective:    HPI       Knee Activity of Daily Living Score: 95.39            Objective:    System    Physical Exam    General     ROS    Assessment/Plan:      PROGRESS  REPORT    Progress reporting period is from 2/16/17 to 3/24/17.       SUBJECTIVE  Subjective changes noted by patient:  .  Subjective: Pt reports tolerating allex well. Has questions re:  HEP and ex she should do at the club.  Has seen surgeon and was told she didn't need to see him until 1 ys post.      Current pain level is  Current Pain level: 0/10.     Previous pain level was   Initial Pain level: 2/10.   Changes in function:  Yes (See Goal flowsheet attached for changes in current functional level)  Adverse reaction to treatment or activity: None    OBJECTIVE  Changes noted in objective findings:    Objective: PROM of R knee=0/0/125.  Minimal to no swelling.  Gait is approaching NL.  Lifting 7# with quad, tolerating all CKC ex well.  Given ex program for home and club.  Corrected mechanics with going down stairs.  Pt will be going to [place] for several weeks.  If any problems develop, she is to check in with therapist.  Otherwise will be DCed to [facility].     ASSESSMENT/PLAN  Updated problem list and treatment plan: Diagnosis 1:  S/P Right TKA  Decreased ROM/flexibility - manual therapy and therapeutic exercise  Decreased strength - therapeutic exercise and therapeutic activities  Impaired balance - neuro re-education and therapeutic activities  Impaired muscle performance - neuro re-education  Decreased function - therapeutic activities  STG/LTGs have been met or progress has been made towards goals:  Yes (See Goal flow sheet completed today.)  Assessment of Progress: Patient is meeting short term goals and is progressing towards long term goals.  Self Management Plans:  Patient is independent in a home treatment program.  Patient is independent in self management of symptoms.    Rebecca continues to require the following intervention to meet  STG and LTG's:  PT    Recommendations:  Pt will be in [place] over the next several weeks.  If needed, will check in with PT upon her return.    Please refer to the daily flowsheet for treatment today, total treatment time and time spent performing 1:1 timed codes.

## 2017-04-13 ENCOUNTER — THERAPY VISIT (OUTPATIENT)
Dept: PHYSICAL THERAPY | Facility: CLINIC | Age: 72
End: 2017-04-13
Payer: MEDICARE

## 2017-04-13 DIAGNOSIS — M25.561 ACUTE PAIN OF RIGHT KNEE: ICD-10-CM

## 2017-04-13 PROCEDURE — 97530 THERAPEUTIC ACTIVITIES: CPT | Mod: GP | Performed by: PHYSICAL THERAPIST

## 2017-04-13 PROCEDURE — G8980 MOBILITY D/C STATUS: HCPCS | Mod: GP | Performed by: PHYSICAL THERAPIST

## 2017-04-13 PROCEDURE — 97110 THERAPEUTIC EXERCISES: CPT | Mod: GP | Performed by: PHYSICAL THERAPIST

## 2017-04-13 PROCEDURE — G8979 MOBILITY GOAL STATUS: HCPCS | Mod: GP | Performed by: PHYSICAL THERAPIST

## 2017-04-13 NOTE — LETTER
[facility] PHYSICAL [facility]  [address]  [place] [address]  [phone]    2017    Re: Rebecca Prieto   :   1945  MRN:  [id number]   REFERRING PHYSICIAN:   Jean Wallace    [facility] PHYSICAL [facility]  Date of Initial Evaluation:  2016  Visits: 22 Rxs Used: 22  Reason for Referral:  Acute pain of right knee  DISCHARGE REPORT  Progress reporting period is from 3/24/17 to 17.     SUBJECTIVE  Subjective changes noted by patient:  .  Subjective: Pt reports being relaively painfree in her right knee.  Will note some discomfort with going down stairs, but is able to correct mechanics and be painfree.  Is noting more intermittent pain in her left knee.  Finds that left knee will crack intermittently.  Is independent with HEP for right knee.      Current pain level is  Current Pain level: 0/10.     Previous pain level was   Initial Pain level: 2/10.   Changes in function:  Yes (See Goal flowsheet attached for changes in current functional level)  Adverse reaction to treatment or activity: None  OBJECTIVE  Changes noted in objective findings:    Objective: PROM of right knee remains 0/0/125.  Left knee reveals mild effusion. Is painful at end range flexion posteriorly. Strength of right knee is improving, but still is 4+/5 to 5-/5 in quad and hamstring.  Right glut med strength is improving.  Pt will check in with MD re: left knee as needed.  Will perform stregthening to both knees if able.  Pt is ready for DC with HEP.   ASSESSMENT/PLAN  Updated problem list and treatment plan: Diagnosis 1:  S/P Right Knee TKA  Pain -  hot/cold therapy, manual therapy, self management, education and home program  Decreased joint mobility - manual therapy and therapeutic exercise  Decreased strength - therapeutic exercise and therapeutic activities  Impaired muscle performance - neuro re-education  Decreased function -  therapeutic activities  STG/LTGs have been met or progress has been made towards goals:  Yes (See Goal flow sheet completed today.)  Assessment of Progress: Patient is meeting short term goals and is progressing towards long term goals.  Self Management Plans:  Patient is independent in a home treatment program.  Patient is independent in self management of symptoms.  Rebecca continues to require the following intervention to meet STG and LTG's:  PT  Recommendations:  This patient is ready to be discharged from therapy and continue their home treatment   Re: Rebecca Prieto   :   1945    program.    Thank you for your referral.    INQUIRIES  Therapist:Candy Forbes PT, ScD, [facility]  [facility] [place] [facility]AP[facility]  [address] [place] [address] [id number] [id number]  Phone: [phone]  Fax: [phone]

## 2017-04-14 NOTE — PROGRESS NOTES
Subjective:    HPI                    Objective:    System    Physical Exam    General     ROS    Assessment/Plan:      DISCHARGE REPORT    Progress reporting period is from 3/24/17 to 4/13/17.       SUBJECTIVE  Subjective changes noted by patient:  .  Subjective: Pt reports being relaively painfree in her right knee.  Will note some discomfort with going down stairs, but is able to correct mechanics and be painfree.  Is noting more intermittent pain in her left knee.  Finds that left knee will crack intermittently.  Is independent with HEP for right knee.      Current pain level is  Current Pain level: 0/10.     Previous pain level was   Initial Pain level: 2/10.   Changes in function:  Yes (See Goal flowsheet attached for changes in current functional level)  Adverse reaction to treatment or activity: None    OBJECTIVE  Changes noted in objective findings:    Objective: PROM of right knee remains 0/0/125.  Left knee reveals mild effusion. Is painful at end range flexion posteriorly. Strength of right knee is improving, but still is 4+/5 to 5-/5 in quad and hamstring.  Right glut med strength is improving.  Pt will check in with MD re: left knee as needed.  Will perform stregthening to both knees if able.  Pt is ready for DC with HEP.     ASSESSMENT/PLAN  Updated problem list and treatment plan: Diagnosis 1:  S/P Right Knee TKA  Pain -  hot/cold therapy, manual therapy, self management, education and home program  Decreased joint mobility - manual therapy and therapeutic exercise  Decreased strength - therapeutic exercise and therapeutic activities  Impaired muscle performance - neuro re-education  Decreased function - therapeutic activities  STG/LTGs have been met or progress has been made towards goals:  Yes (See Goal flow sheet completed today.)  Assessment of Progress: Patient is meeting short term goals and is progressing towards long term goals.  Self Management Plans:  Patient is independent in a home  treatment program.  Patient is independent in self management of symptoms.    Rebecca continues to require the following intervention to meet STG and LTG's:  PT    Recommendations:  This patient is ready to be discharged from therapy and continue their home treatment program.    Please refer to the daily flowsheet for treatment today, total treatment time and time spent performing 1:1 timed codes.

## 2017-05-09 ENCOUNTER — TELEPHONE (OUTPATIENT)
Dept: FAMILY MEDICINE | Facility: CLINIC | Age: 72
End: 2017-05-09

## 2017-05-09 NOTE — TELEPHONE ENCOUNTER
"05/09/17        PHS call not required completed HM screening      Canh \"Violeta\" Eliseo  Central Scheduler            "

## 2017-12-06 ENCOUNTER — TRANSFERRED RECORDS (OUTPATIENT)
Dept: HEALTH INFORMATION MANAGEMENT | Facility: CLINIC | Age: 72
End: 2017-12-06

## 2018-03-02 ENCOUNTER — HOSPITAL ENCOUNTER (OUTPATIENT)
Dept: MAMMOGRAPHY | Facility: CLINIC | Age: 73
Discharge: HOME | End: 2018-03-02
Attending: OBSTETRICS & GYNECOLOGY | Admitting: OBSTETRICS & GYNECOLOGY
Payer: MEDICARE

## 2018-03-02 DIAGNOSIS — Z12.31 VISIT FOR SCREENING MAMMOGRAM: ICD-10-CM

## 2018-03-02 PROCEDURE — 77067 SCR MAMMO BI INCL CAD: CPT

## 2018-04-02 DIAGNOSIS — M48.00 SPINAL STENOSIS, UNSPECIFIED SPINAL REGION: ICD-10-CM

## 2018-04-02 NOTE — TELEPHONE ENCOUNTER
gabapentin (NEURONTIN) 300 MG capsule 270 capsule 3 2/6/2017           Last Written Prescription Date:  02/06/2017  Last Fill Quantity: 270,   # refills: 3  Last Office Visit: 02/06/2017  Future Office visit:  04/30/2018  Next 5 appointments (look out 90 days)     Apr 30, 2018  9:30 AM CDT   [facility]    [address] [facility] [address]   [phone]                   Routing refill request to provider for review/approval because:  Drug not on the [facility], [facility] or  Health refill protocol or controlled substance

## 2018-04-03 RX ORDER — GABAPENTIN 300 MG/1
CAPSULE ORAL
Qty: 270 CAPSULE | Refills: 3 | Status: SHIPPED | OUTPATIENT
Start: 2018-04-03 | End: 2018-05-10

## 2018-04-09 ENCOUNTER — OFFICE VISIT (OUTPATIENT)
Dept: FAMILY MEDICINE | Facility: CLINIC | Age: 73
End: 2018-04-09
Payer: MEDICARE

## 2018-04-09 VITALS
HEART RATE: 92 BPM | TEMPERATURE: 99.1 F | HEIGHT: 67 IN | WEIGHT: 140 LBS | BODY MASS INDEX: 21.97 KG/M2 | OXYGEN SATURATION: 98 % | DIASTOLIC BLOOD PRESSURE: 72 MMHG | SYSTOLIC BLOOD PRESSURE: 108 MMHG

## 2018-04-09 DIAGNOSIS — R05.9 COUGH: ICD-10-CM

## 2018-04-09 DIAGNOSIS — J10.1 INFLUENZA B: ICD-10-CM

## 2018-04-09 DIAGNOSIS — J06.9 VIRAL UPPER RESPIRATORY TRACT INFECTION: Primary | ICD-10-CM

## 2018-04-09 DIAGNOSIS — J32.9 SINUSITIS, UNSPECIFIED CHRONICITY, UNSPECIFIED LOCATION: ICD-10-CM

## 2018-04-09 LAB
DEPRECATED S PYO AG THROAT QL EIA: NORMAL
FLUAV+FLUBV AG SPEC QL: NEGATIVE
FLUAV+FLUBV AG SPEC QL: POSITIVE
SPECIMEN SOURCE: ABNORMAL
SPECIMEN SOURCE: NORMAL

## 2018-04-09 PROCEDURE — 87081 CULTURE SCREEN ONLY: CPT | Performed by: INTERNAL MEDICINE

## 2018-04-09 PROCEDURE — 99213 OFFICE O/P EST LOW 20 MIN: CPT | Performed by: INTERNAL MEDICINE

## 2018-04-09 PROCEDURE — 87880 STREP A ASSAY W/OPTIC: CPT | Performed by: INTERNAL MEDICINE

## 2018-04-09 PROCEDURE — 87804 INFLUENZA ASSAY W/OPTIC: CPT | Mod: 59 | Performed by: INTERNAL MEDICINE

## 2018-04-09 RX ORDER — AZITHROMYCIN 250 MG/1
TABLET, FILM COATED ORAL
Qty: 6 TABLET | Refills: 1 | Status: SHIPPED | OUTPATIENT
Start: 2018-04-09 | End: 2018-05-10

## 2018-04-09 RX ORDER — PREDNISONE 20 MG/1
20 TABLET ORAL 2 TIMES DAILY WITH MEALS
Qty: 14 TABLET | Refills: 1 | Status: SHIPPED | OUTPATIENT
Start: 2018-04-09 | End: 2018-05-10

## 2018-04-09 RX ORDER — OSELTAMIVIR PHOSPHATE 75 MG/1
75 CAPSULE ORAL 2 TIMES DAILY
Qty: 10 CAPSULE | Refills: 0 | Status: SHIPPED | OUTPATIENT
Start: 2018-04-09 | End: 2018-05-10

## 2018-04-09 RX ORDER — AZITHROMYCIN 250 MG/1
TABLET, FILM COATED ORAL
Qty: 6 TABLET | Refills: 1 | Status: SHIPPED | OUTPATIENT
Start: 2018-04-09 | End: 2018-04-09

## 2018-04-09 RX ORDER — OSELTAMIVIR PHOSPHATE 75 MG/1
75 CAPSULE ORAL 2 TIMES DAILY
Qty: 10 CAPSULE | Refills: 0 | Status: SHIPPED | OUTPATIENT
Start: 2018-04-09 | End: 2018-04-09

## 2018-04-09 RX ORDER — PREDNISONE 20 MG/1
20 TABLET ORAL 2 TIMES DAILY WITH MEALS
Qty: 14 TABLET | Refills: 1 | Status: SHIPPED | OUTPATIENT
Start: 2018-04-09 | End: 2018-04-09

## 2018-04-09 NOTE — PROGRESS NOTES
SUBJECTIVE:   Rebecca Prieto is a 73 year old female who presents to clinic today for the following health issues:    Chief Complaint   Patient presents with     Cough     1 week brown -green prod cough, no appetite, had soret rhaot Wed & Thru. Chills, HA, Sinus HA . weak, diarrhea.  Tried zycam, netti pot, tyl, dayquil           Current Medications:     Current Outpatient Prescriptions   Medication Sig Dispense Refill     oseltamivir (TAMIFLU) 75 MG capsule Take 1 capsule (75 mg) by mouth 2 times daily 10 capsule 0     azithromycin (ZITHROMAX) 250 MG tablet Two tablets first day, then one tablet daily for four days. 6 tablet 1     predniSONE (DELTASONE) 20 MG tablet Take 1 tablet (20 mg) by mouth 2 times daily (with meals) 14 tablet 1     gabapentin (NEURONTIN) 300 MG capsule TAKE 1 CAPSULE THREE TIMES A  capsule 3     SUMAtriptan (IMITREX) 50 MG tablet Take 1 tablet (50 mg) by mouth as needed for migraine 18 tablet 3     conjugated estrogens (PREMARIN) vaginal cream Place 1 g vaginally twice a week       Calcium Carbonate-Vitamin D (CALCIUM + D PO) Take 1 capsule by mouth daily            Allergies:      Allergies   Allergen Reactions     Nickel      Succinylcholine      Gets paralyzed            Past Medical History:     Past Medical History:   Diagnosis Date     Adenomatous polyp of colon 2002    fu done 2011 and to fu 2014, fu done 2014 and to fu 2019     Hypercholesteraemia      Migraines      Osteopenia 2005    fu 2009 better, -0.4 spine and -1.1 fem neck, has fu with gyn     PONV (postoperative nausea and vomiting)      Screening 2005    abd us neg for aaa due to fh     Spinal stenosis     on neurontin     Spondylolistheses      Tonsillar abscess 1992     Vitamin D deficiency 2014         Past Surgical History:     Past Surgical History:   Procedure Laterality Date     breast biopsies      twice     HYSTERECTOMY, PAP NO LONGER INDICATED  1998    due to hyperplasia     right knee arthroscopy   "1998     right knee replacement  2016    [place]     ROTATOR CUFF REPAIR RT/LT  2007     STRIP VEIN  2010         Family Medical History:     Family History   Problem Relation Age of Onset     Prostate Cancer Brother          Social History:     Social History     Social History     Marital status:      Spouse name: N/A     Number of children: 3     Years of education: N/A     Occupational History     Not on file.     Social History Main Topics     Smoking status: Never Smoker     Smokeless tobacco: Never Used     Alcohol use 4.2 oz/week     7 Standard drinks or equivalent per week     Drug use: No     Sexual activity: Yes     Partners: Male     Other Topics Concern     Not on file     Social History Narrative           Review of System:     Constitutional: Negative for fever or chills  Skin: Negative for rashes  Ears/Nose/Throat: Positive for nasal congestion, sore throat  Respiratory: positive for cough  Cardiovascular: Negative for chest pain  Gastrointestinal: Negative for nausea, vomiting  Genitourinary: Negative for dysuria, hematuria  Musculoskeletal: Negative for myalgias  Neurologic: Negative for headaches  Psychiatric: Negative for depression, anxiety  Hematologic/Lymphatic/Immunologic: Negative  Endocrine: Negative  Behavioral: Negative for tobacco use       Physical Exam:   /72 (BP Location: Left arm, Patient Position: Chair, Cuff Size: Adult Regular)  Pulse 92  Temp 99.1  F (37.3  C) (Tympanic)  Ht 5' 7\" (1.702 m)  Wt 140 lb (63.5 kg)  SpO2 98%  Breastfeeding? No  BMI 21.93 kg/m2    GENERAL: alert and no distress  EYES: eyes grossly normal to inspection, and conjunctivae and sclerae normal  HENT: Normocephalic atraumatic. Nose and mouth without ulcers or lesions. Nasal congestion and rhinorrhea drainage present.  NECK: supple  RESP: Intermittent dry coughing spells present  CV: regular rate and rhythm, normal S1 S2  LYMPH: no peripheral edema   ABDOMEN: nondistended  MS: no gross " musculoskeletal defects noted  SKIN: no suspicious lesions or rashes  NEURO: Alert & Oriented x 3.   PSYCH: mentation appears normal, affect normal        Diagnostic Test Results:     Diagnostic Test Results:  Results for orders placed or performed in visit on 04/09/18   Influenza A/B antigen   Result Value Ref Range    Influenza A/B Agn Specimen Nasopharyngeal     Influenza A Negative NEG^Negative    Influenza B Positive (A) NEG^Negative   Strep, Rapid Screen   Result Value Ref Range    Specimen Description Throat     Rapid Strep A Screen       NEGATIVE: No Group A streptococcal antigen detected by immunoassay, await culture report.       ASSESSMENT/PLAN:       (J06.9) URI (upper respiratory infection)  (primary encounter diagnosis)  (J10.1) Influenza B  (R05) Cough  (J32.9) Sinusitis, unspecified chronicity, unspecified location  Comment: several days of new URI symptoms with cough and sinusitis.  Plan: I have ordered labs for Influenza A/B antigen, Strep, Rapid Screen, Beta strep group A culture which is positive for influenza B infection. I have prescribed oseltamivir (TAMIFLU) 75 MG capsule, predniSONE  (DELTASONE) 20 MG tablet, and azithromycin (ZITHROMAX) 250 MG tablet for treatment.      Follow Up Plan:     Patient is instructed to return to Internal Medicine clinic for follow-up visit in 1 week.        Amira Keller MD  Internal Medicine  [facility]

## 2018-04-09 NOTE — NURSING NOTE
"Chief Complaint   Patient presents with     Cough     1 week brown -green prod cough, no appetite, had soret rhaot Wed & Thru. Chills, HA, Sinus HA . weak, diarrhea.  Tried zycam, netti pot, tyl, dayquil         Initial /72 (BP Location: Left arm, Patient Position: Chair, Cuff Size: Adult Regular)  Pulse 92  Temp 99.1  F (37.3  C) (Tympanic)  Ht 5' 7\" (1.702 m)  Wt 140 lb (63.5 kg)  SpO2 98%  Breastfeeding? No  BMI 21.93 kg/m2 Estimated body mass index is 21.93 kg/(m^2) as calculated from the following:    Height as of this encounter: 5' 7\" (1.702 m).    Weight as of this encounter: 140 lb (63.5 kg).  Medication Reconciliation: complete    "

## 2018-04-09 NOTE — MR AVS SNAPSHOT
"              After Visit Summary   4/9/2018    Rebecca Prieto    MRN: [id number]           Patient Information     Date Of Birth          1945        Visit Information        Provider Department      4/9/2018 1:20 PM Amira Keller MD [facility]        Today's Diagnoses     Viral upper respiratory tract infection    -  1    Influenza B        Cough        Sinusitis, unspecified chronicity, unspecified location           Follow-ups after your visit        Your next 10 appointments already scheduled     Apr 30, 2018  9:30 AM CDT   PHYSICAL with Marco Jones MD   [facility]    [address] [facility] [id number] [phone]              Who to contact     If you have questions or need follow up information about today's clinic visit or your schedule please contact [facility] directly at [phone].  Normal or non-critical lab and imaging results will be communicated to you by MyChart, letter or phone within 4 business days after the clinic has received the results. If you do not hear from us within 7 days, please contact the clinic through MyChart or phone. If you have a critical or abnormal lab result, we will notify you by phone as soon as possible.  Submit refill requests through [employer] or call your pharmacy and they will forward the refill request to us. Please allow 3 business days for your refill to be completed.          Additional Information About Your Visit        MyChart Information     [employer] lets you send messages to your doctor, view your test results, renew your prescriptions, schedule appointments and more. To sign up, go to [email or web address] . Click on \"Log in\" on the left side of the screen, which will take you to the Welcome page. Then click on \"Sign up Now\" on the right side of the page.     You will be asked to enter the access code listed below, as well as some personal information. Please " "follow the directions to create your username and password.     Your access code is: [email or web address]  Expires: 2018  4:00 PM     Your access code will  in 90 days. If you need help or a new code, please call your [facility] clinic or [phone].        Care EveryWhere ID     This is your Care EveryWhere ID. This could be used by other organizations to access your [facility] medical records  [id number]        Your Vitals Were     Pulse Temperature Height Pulse Oximetry Breastfeeding? BMI (Body Mass Index)    92 99.1  F (37.3  C) (Tympanic) 5' 7\" (1.702 m) 98% No 21.93 kg/m2       Blood Pressure from Last 3 Encounters:   18 108/72   17 119/73   16 102/68    Weight from Last 3 Encounters:   18 140 lb (63.5 kg)   17 149 lb 11.2 oz (67.9 kg)   16 144 lb (65.3 kg)              We Performed the Following     Beta strep group A culture     Influenza A/B antigen     Strep, Rapid Screen          Today's Medication Changes          These changes are accurate as of 18  4:00 PM.  If you have any questions, ask your nurse or doctor.               Start taking these medicines.        Dose/Directions    azithromycin 250 MG tablet   Commonly known as:  ZITHROMAX   Used for:  Cough, Sinusitis, unspecified chronicity, unspecified location   Started by:  Amira Keller MD        Two tablets first day, then one tablet daily for four days.   Quantity:  6 tablet   Refills:  1       oseltamivir 75 MG capsule   Commonly known as:  TAMIFLU   Used for:  Influenza B, Cough   Started by:  Amira Keller MD        Dose:  75 mg   Take 1 capsule (75 mg) by mouth 2 times daily   Quantity:  10 capsule   Refills:  0       predniSONE 20 MG tablet   Commonly known as:  DELTASONE   Used for:  Influenza B, Cough, Sinusitis, unspecified chronicity, unspecified location   Started by:  Amira Keller MD        Dose:  20 mg   Take 1 tablet (20 mg) by mouth 2 times daily (with meals)   Quantity:  14 " tablet   Refills:  1            Where to get your medicines      Some of these will need a paper prescription and others can be bought over the counter.  Ask your nurse if you have questions.     Bring a paper prescription for each of these medications     azithromycin 250 MG tablet    oseltamivir 75 MG capsule    predniSONE 20 MG tablet                Primary Care Provider Office Phone # Fax #    Marco Jones [phone] [address] [address]        Equal Access to Services     [facility] : Hadii aad ku hadasho Soomaali, waaxda luqadaha, qaybta kaalmada adeegyada, waxay idiin hayaan adeeg kharash la'moody . So [facility] [phone].    ATENCIÓN: Si habla español, tiene a hameed disposición servicios gratuitos de asistencia lingüística. [facility] [phone].    We comply with applicable federal civil rights laws and [place] laws. We do not discriminate on the basis of race, color, national origin, age, disability, sex, sexual orientation, or gender identity.            Thank you!     Thank you for choosing [facility]  for your care. Our goal is always to provide you with excellent care. Hearing back from our patients is one way we can continue to improve our services. Please take a few minutes to complete the written survey that you may receive in the mail after your visit with us. Thank you!             Your Updated Medication List - Protect others around you: Learn how to safely use, store and throw away your medicines at www.disposemymeds.org.          This list is accurate as of 4/9/18  4:00 PM.  Always use your most recent med list.                   Brand Name Dispense Instructions for use Diagnosis    azithromycin 250 MG tablet    ZITHROMAX    6 tablet    Two tablets first day, then one tablet daily for four days.    Cough, Sinusitis, unspecified chronicity, unspecified location       CALCIUM + D PO      Take 1 capsule by mouth daily        conjugated  estrogens cream    PREMARIN     Place 1 g vaginally twice a week        gabapentin 300 MG capsule    NEURONTIN    270 capsule    TAKE 1 CAPSULE THREE TIMES A DAY    Spinal stenosis, unspecified spinal region       oseltamivir 75 MG capsule    TAMIFLU    10 capsule    Take 1 capsule (75 mg) by mouth 2 times daily    Influenza B, Cough       predniSONE 20 MG tablet    DELTASONE    14 tablet    Take 1 tablet (20 mg) by mouth 2 times daily (with meals)    Influenza B, Cough, Sinusitis, unspecified chronicity, unspecified location       SUMAtriptan 50 MG tablet    IMITREX    18 tablet    Take 1 tablet (50 mg) by mouth as needed for migraine    Other type of migraine without status migrainosus

## 2018-04-10 ENCOUNTER — TELEPHONE (OUTPATIENT)
Dept: FAMILY MEDICINE | Facility: CLINIC | Age: 73
End: 2018-04-10

## 2018-04-10 DIAGNOSIS — R11.0 NAUSEA: ICD-10-CM

## 2018-04-10 LAB
BACTERIA SPEC CULT: NORMAL
SPECIMEN SOURCE: NORMAL

## 2018-04-10 NOTE — TELEPHONE ENCOUNTER
Pt started Prednisone/Tamiflu yesterday.  Took with whole bowl of soup (forced self to eat), and vomited up soup and medications.    Has been slightly nauseated, over past few days, but this was first time vomiting.    Pt requesting advice if ok to not take tamiflu or prednisone:  Discussed more likely nausea caused by tamiflu.  Would it be ok for pt to d/c tamiflu, but continue prednisone?    Pt also was using recently  zofran (first prescribed in 2016), asking for another small refill of Zofran for nausea.  Pended to pharmacy.    Educated on advancing diet starting with small meals, frequently and increasing as tolerating (ie 1/2 cup of soup vs 2 cups to start).  Pt is in agreement with this.    Please review  Zofran RX and if ok to d/c tamiflu, or advse other option.  Jyothi Earl RN

## 2018-04-10 NOTE — TELEPHONE ENCOUNTER
Reason for Call:  Flu symptoms     Detailed comments: Pt was diagnosed with Influenza B by Dr. Keller states she went home and ate chicken noodle soup, took a half of dose of Prednisone, and she also took mateo flu- states about 1.5 hours she vomited,  She states after that she had a little ginger ale, and that is all she had    Pt called and spoke with pharmacy and asked if she could take a zofran ( which she already had) and was told yes, so she took a Zofran at 8:00pm and went to bed.     Stomach did settle down and was able to sleep a little bit, when she got woke up this morning her stomach has been causing her issues again, at 7am she took a another Zofran ( 4MG) she let that settle until about 9am, and she felt quite a bit better by then.     She had a very small bowl of cheerios at 9am, has been sipping on ginger ale and has not taken any more medication she is concerned about taking it.     Pt has also diarrhea this morning.       Phone Number Patient can be reached at: Cell number on file:    Telephone Information:   Mobile [phone]       Best Time: any    Can we leave a detailed message on this number? YES    Call taken on 4/10/2018 at 10:13 AM by Aga Vila

## 2018-04-11 RX ORDER — ONDANSETRON 4 MG/1
4 TABLET, FILM COATED ORAL EVERY 8 HOURS PRN
Qty: 15 TABLET | Refills: 0 | Status: SHIPPED | OUTPATIENT
Start: 2018-04-11 | End: 2018-05-10

## 2018-04-11 NOTE — TELEPHONE ENCOUNTER
Returned patient's call:     She was instructed to wait to take Zithromax until she completed Tamiflu.     Dr. Keller- Since her last dose of Tamiflu was Monday, 4/9 (had only taken 1 dose). OK to begin taking Zithromax for sinus infection?     Thank you,   Miri BHAKTA RN

## 2018-04-11 NOTE — TELEPHONE ENCOUNTER
information below was relayed to pt.   Pt is also wondering if she should start taking the azithromycin (ZITHROMAX) 250 MG tablet now ( for Sinus infection)   please call pt and instruct   Ph. [phone] VM is okay

## 2018-04-11 NOTE — TELEPHONE ENCOUNTER
Please notify pt that it's OK  for pt to d/c tamiflu, but continue prednisone.    zofran refill prescription approved.

## 2018-04-16 NOTE — TELEPHONE ENCOUNTER
"Spoke with patient:   Just finished Z-Bright  Still having \"a little bit\" of symptoms   Was given for sinus infection/cough, which are much improved  No sinus headache today  Renewed Rx this morning   Is leaving for 1 week to [place] tomorrow    Still doing some coughing, not much - previously was coughing up green   Denies fever  Little residual sinus pressure - mild   Some drainage still - yellow now, before was greenish     PLAN: is to bring abx Rx along to [place]. Will call if develops any new/worsening symptoms (e.g. Fever, worsening cough, return of dark green mucus) prior to taking the med if needed. Otherwise will monitor symptoms if they continue to improve     Please advise on above plan  Call pt back at: [phone]  "

## 2018-04-16 NOTE — TELEPHONE ENCOUNTER
Pt took last antibiotic today. Pt is wondering if she should get a refill and continue taking since she is going out of town. Please call pt to discuss:    [phone] ERMIAS ok

## 2018-05-08 DIAGNOSIS — G43.909 MIGRAINE: Primary | ICD-10-CM

## 2018-05-08 NOTE — TELEPHONE ENCOUNTER
"SUMAtriptan (IMITREX) 50 MG tablet 18 tablet 3 2/6/2017     Last Written Prescription Date:  2/6/17  Last Fill Quantity: 18,  # refills: 3   Last office visit: 4/9/2018 with prescribing provider:  Robert   Future Office Visit:   Next 5 appointments (look out 90 days)     May 10, 2018  9:00 AM CDT   PHYSICAL with Marco Jones MD   [facility]    [address] [facility] [address] [phone]                   Requested Prescriptions   Pending Prescriptions Disp Refills     SUMAtriptan (IMITREX) 50 MG tablet [Pharmacy Med Name: SUMATRIPTAN TABS 9'S 50MG] 18 tablet 3     Sig: TAKE 1 TABLET AS NEEDED FOR MIGRAINE    Serotonin Agonists Failed    5/8/2018  2:28 PM       Failed - Serotonin Agonist request needs review.    Please review patient's record. If patient has had 8 or more treatments in the past month, please forward to provider.         Passed - Blood pressure under 140/90 in past 12 months    BP Readings from Last 3 Encounters:   04/09/18 108/72   02/06/17 119/73   12/08/16 102/68                Passed - Recent (12 mo) or future (30 days) visit within the authorizing provider's specialty    Patient had office visit in the last 12 months or has a visit in the next 30 days with authorizing provider or within the authorizing provider's specialty.  See \"Patient Info\" tab in inbasket, or \"Choose Columns\" in Meds & Orders section of the refill encounter.           Passed - Patient is age 18 or older       Passed - No active pregnancy on record       Passed - No positive pregnancy test in past 12 months        PHQ-9 SCORE 11/29/2016   Total Score 2       "

## 2018-05-09 RX ORDER — SUMATRIPTAN 50 MG/1
TABLET, FILM COATED ORAL
Qty: 18 TABLET | Refills: 0 | Status: SHIPPED | OUTPATIENT
Start: 2018-05-09 | End: 2018-05-10

## 2018-05-09 NOTE — TELEPHONE ENCOUNTER
Prescription approved per [facility] Refill Protocol.  Monica Alexander RN

## 2018-05-10 ENCOUNTER — OFFICE VISIT (OUTPATIENT)
Dept: FAMILY MEDICINE | Facility: CLINIC | Age: 73
End: 2018-05-10
Payer: MEDICARE

## 2018-05-10 VITALS
OXYGEN SATURATION: 99 % | SYSTOLIC BLOOD PRESSURE: 121 MMHG | BODY MASS INDEX: 22.76 KG/M2 | HEART RATE: 61 BPM | HEIGHT: 67 IN | TEMPERATURE: 98.2 F | WEIGHT: 145 LBS | DIASTOLIC BLOOD PRESSURE: 74 MMHG

## 2018-05-10 DIAGNOSIS — E55.9 VITAMIN D DEFICIENCY: ICD-10-CM

## 2018-05-10 DIAGNOSIS — M85.80 OSTEOPENIA, UNSPECIFIED LOCATION: ICD-10-CM

## 2018-05-10 DIAGNOSIS — D12.6 ADENOMATOUS POLYP OF COLON, UNSPECIFIED PART OF COLON: ICD-10-CM

## 2018-05-10 DIAGNOSIS — M48.00 SPINAL STENOSIS, UNSPECIFIED SPINAL REGION: ICD-10-CM

## 2018-05-10 DIAGNOSIS — E78.5 HYPERLIPIDEMIA LDL GOAL <160: ICD-10-CM

## 2018-05-10 DIAGNOSIS — Z00.00 ROUTINE GENERAL MEDICAL EXAMINATION AT A HEALTH CARE FACILITY: Primary | ICD-10-CM

## 2018-05-10 DIAGNOSIS — G43.809 OTHER MIGRAINE WITHOUT STATUS MIGRAINOSUS, NOT INTRACTABLE: ICD-10-CM

## 2018-05-10 LAB
ALBUMIN SERPL-MCNC: 3.8 G/DL (ref 3.4–5)
ALP SERPL-CCNC: 50 U/L (ref 40–150)
ALT SERPL W P-5'-P-CCNC: 21 U/L (ref 0–50)
ANION GAP SERPL CALCULATED.3IONS-SCNC: 9 MMOL/L (ref 3–14)
AST SERPL W P-5'-P-CCNC: 17 U/L (ref 0–45)
BILIRUB SERPL-MCNC: 0.5 MG/DL (ref 0.2–1.3)
BUN SERPL-MCNC: 19 MG/DL (ref 7–30)
CALCIUM SERPL-MCNC: 9 MG/DL (ref 8.5–10.1)
CHLORIDE SERPL-SCNC: 107 MMOL/L (ref 94–109)
CHOLEST SERPL-MCNC: 256 MG/DL
CO2 SERPL-SCNC: 27 MMOL/L (ref 20–32)
CREAT SERPL-MCNC: 0.76 MG/DL (ref 0.52–1.04)
ERYTHROCYTE [DISTWIDTH] IN BLOOD BY AUTOMATED COUNT: 14.4 % (ref 10–15)
GFR SERPL CREATININE-BSD FRML MDRD: 75 ML/MIN/1.7M2
GLUCOSE SERPL-MCNC: 85 MG/DL (ref 70–99)
HCT VFR BLD AUTO: 39.7 % (ref 35–47)
HDLC SERPL-MCNC: 81 MG/DL
HGB BLD-MCNC: 12.7 G/DL (ref 11.7–15.7)
LDLC SERPL CALC-MCNC: 163 MG/DL
MCH RBC QN AUTO: 29.6 PG (ref 26.5–33)
MCHC RBC AUTO-ENTMCNC: 32 G/DL (ref 31.5–36.5)
MCV RBC AUTO: 93 FL (ref 78–100)
NONHDLC SERPL-MCNC: 175 MG/DL
PLATELET # BLD AUTO: 294 10E9/L (ref 150–450)
POTASSIUM SERPL-SCNC: 4.4 MMOL/L (ref 3.4–5.3)
PROT SERPL-MCNC: 6.9 G/DL (ref 6.8–8.8)
RBC # BLD AUTO: 4.29 10E12/L (ref 3.8–5.2)
SODIUM SERPL-SCNC: 143 MMOL/L (ref 133–144)
TRIGL SERPL-MCNC: 59 MG/DL
WBC # BLD AUTO: 4.6 10E9/L (ref 4–11)

## 2018-05-10 PROCEDURE — 80061 LIPID PANEL: CPT | Performed by: INTERNAL MEDICINE

## 2018-05-10 PROCEDURE — 85027 COMPLETE CBC AUTOMATED: CPT | Performed by: INTERNAL MEDICINE

## 2018-05-10 PROCEDURE — G0439 PPPS, SUBSEQ VISIT: HCPCS | Performed by: INTERNAL MEDICINE

## 2018-05-10 PROCEDURE — 80053 COMPREHEN METABOLIC PANEL: CPT | Performed by: INTERNAL MEDICINE

## 2018-05-10 PROCEDURE — 36415 COLL VENOUS BLD VENIPUNCTURE: CPT | Performed by: INTERNAL MEDICINE

## 2018-05-10 RX ORDER — GABAPENTIN 300 MG/1
CAPSULE ORAL
Qty: 270 CAPSULE | Refills: 3 | Status: SHIPPED | OUTPATIENT
Start: 2018-05-10 | End: 2019-06-23

## 2018-05-10 RX ORDER — SUMATRIPTAN 50 MG/1
50 TABLET, FILM COATED ORAL
Qty: 9 TABLET | Refills: 3 | Status: SHIPPED | OUTPATIENT
Start: 2018-05-10 | End: 2019-03-09

## 2018-05-10 NOTE — PROGRESS NOTES
SUBJECTIVE:   Rebecca Prieto is a 73 year old female who presents for Preventive Visit.    The patient feels fine, no c/o on review of systems, works out reg, up to date with gyn, dexa done there.               Past Medical History:      Past Medical History:   Diagnosis Date     Adenomatous polyp of colon 2002    fu done 2011 and to fu 2014, fu done 2014 and to fu 2019     Hypercholesteraemia      Migraines      Osteopenia 2005    fu 2009 better, -0.4 spine and -1.1 fem neck, has fu with gyn     PONV (postoperative nausea and vomiting)      Screening 2005    abd us neg for aaa due to fh     Spinal stenosis     on neurontin     Spondylolistheses      Tonsillar abscess 1992     Vitamin D deficiency 2014             Past Surgical History:      Past Surgical History:   Procedure Laterality Date     breast biopsies      twice     HYSTERECTOMY, PAP NO LONGER INDICATED  1998    due to hyperplasia     right knee arthroscopy  1998     right knee [place]  2016    [place]     ROTATOR CUFF REPAIR RT/LT  2007     STRIP VEIN  2010             Social History:     Social History     Social History     Marital status:      Spouse name: N/A     Number of children: 3     Years of education: N/A     Occupational History     Not on file.     Social History Main Topics     Smoking status: Never Smoker     Smokeless tobacco: Never Used     Alcohol use 4.2 oz/week     7 Standard drinks or equivalent per week     Drug use: No     Sexual activity: Yes     Partners: Male     Other Topics Concern     Not on file     Social History Narrative             Family History:   reviewed         Allergies:     Allergies   Allergen Reactions     Nickel      Succinylcholine      Gets paralyzed             Medications:     Current Outpatient Prescriptions   Medication Sig Dispense Refill     Calcium Carbonate-Vitamin D (CALCIUM + D PO) Take 1 capsule by mouth daily        conjugated estrogens (PREMARIN) vaginal cream Place 1 g vaginally  "twice a week       gabapentin (NEURONTIN) 300 MG capsule TAKE 1 CAPSULE THREE TIMES A  capsule 3     [DISCONTINUED] gabapentin (NEURONTIN) 300 MG capsule TAKE 1 CAPSULE THREE TIMES A  capsule 3               Review of Systems:   The 10 point Review of Systems is negative other than noted in the HPI           Physical Exam:   Blood pressure 121/74, pulse 61, temperature 98.2  F (36.8  C), temperature source Oral, height 5' 7\" (1.702 m), weight 145 lb (65.8 kg), SpO2 99 %, not currently breastfeeding.    Exam:  Constitutional: healthy appearing, alert and in no distress  Heent: Normocephalic. Head without obvious masses or lesions. PERRLDC, EOMI. Mouth exam within normal limits: tongue, mucous membranes, posterior pharynx all normal, no lesions or abnormalities seen.  Tm's and canals within normal limits bilaterally. Neck supple, no nuchal rigidity or masses. No supraclavicular, or cervical adenopathy. Thyroid symmetric, no masses.  Cardiovascular: Regular rate and rhythm, no murmer, rub or gallops.  JVP not elevated, no edema.  Carotids within normal limits bilaterally, no bruits.  Respiratory: Normal respiratory effort.  Lungs clear, normal flow, no wheezing or crackles.  Gastrointestinal: Normal active bowel sounds.   Soft, not tender, no masses, guarding or rebound.  No hepatosplenomegaly.   Musculoskeletal: extremities normal, no gross deformities noted.  Skin: no suspicious lesions or rashes   Neurologic: Mental status within normal limits.  Speech fluent.  No gross motor abnormalities and gait intact.  Psychiatric: mentation appears normal and affect normal.         Data:   Labs sent        Assessment:   1. Normal complete physical exam  2. Spinal stenosis, stable  3. Colon polyp, follow up 2019  4. Vit d defic fine last office visit  5. Elevated cholesterol, follow up labs  6. Migraines, no issues  7. hcm         Plan:   shingrx at pharm  Letter with labs  Exercise, diet  Call if " problems      Marco Jones M.D.                Are you in the first 12 months of your Medicare Part B coverage?  No    Healthy Habits:    Do you get at least three servings of calcium containing foods daily (dairy, green leafy vegetables, etc.)? yes    Amount of exercise or daily activities, outside of work: 5 day(s) per week    Problems taking medications regularly No    Medication side effects: No    Have you had an eye exam in the past two years? yes    Do you see a dentist twice per year? yes    Do you have sleep apnea, excessive snoring or daytime drowsiness?no      Ability to successfully perform activities of daily living: Yes, no assistance needed    Home safety:  none identified     Hearing impairment: No    Fall risk:           COGNITIVE SCREEN  1) Repeat 3 items (Banana, Sunrise, Chair)    2) Clock draw:   3) 3 item recall:   Results: 3 items recalled: COGNITIVE IMPAIRMENT LESS LIKELY    Mini-CogTM Copyright S [employer]. Licensed by the author for use in [facility]; reprinted with permission (suzie@[facility]). All rights reserved.                Reviewed and updated as needed this visit by clinical staff         Reviewed and updated as needed this visit by Provider        Social History   Substance Use Topics     Smoking status: Never Smoker     Smokeless tobacco: Never Used     Alcohol use 4.2 oz/week     7 Standard drinks or equivalent per week       If you drink alcohol do you typically have >3 drinks per day or >7 drinks per week? No                        Today's PHQ-2 Score:   PHQ-2 ( 1999 Pfizer) 2/6/2017 11/10/2016   Q1: Little interest or pleasure in doing things 0 0   Q2: Feeling down, depressed or hopeless 0 0   PHQ-2 Score 0 0       Do you feel safe in your environment - Yes    Do you have a Health Care Directive?: Yes: Patient states has Advance Directive and will bring in a copy to clinic.    Current providers sharing in care for this patient include:   Patient Care Team:  Robert  "Marco Springer MD as PCP - General (Internal Medicine)  Sandra Hector MD as MD (OB/Gyn)    The following health maintenance items are reviewed in Epic and correct as of today:  Health Maintenance   Topic Date Due     ADVANCE DIRECTIVE PLANNING Q5 YRS  02/01/2002     DEXA SCAN SCREENING (SYSTEM ASSIGNED)  02/01/2012     FALL RISK ASSESSMENT  02/06/2018     INFLUENZA VACCINE (Season Ended) 09/01/2018     MAMMO SCREEN Q2 YR (SYSTEM ASSIGNED)  03/02/2020     LIPID SCREEN Q5 YR FEMALE (SYSTEM ASSIGNED)  02/02/2022     TETANUS IMMUNIZATION (SYSTEM ASSIGNED)  10/11/2022     COLON CANCER SCREEN (SYSTEM ASSIGNED)  10/30/2024     PNEUMOCOCCAL  Completed     HEPATITIS C SCREENING  Completed       End of Life Planning:  Patient currently has an advanced directive: yes    COUNSELING:  Reviewed preventive health counseling, as reflected in patient instructions       Regular exercise       Healthy diet/nutrition        Estimated body mass index is 21.93 kg/(m^2) as calculated from the following:    Height as of 4/9/18: 5' 7\" (1.702 m).    Weight as of 4/9/18: 140 lb (63.5 kg).       reports that she has never smoked. She has never used smokeless tobacco.      Appropriate preventive services were discussed with this patient, including applicable screening as appropriate for cardiovascular disease, diabetes, osteopenia/osteoporosis, and glaucoma.  As appropriate for age/gender, discussed screening for colorectal cancer, prostate cancer, breast cancer, and cervical cancer. Checklist reviewing preventive services available has been given to the patient.    Reviewed patients plan of care and provided an AVS. The Basic Care Plan (routine screening as documented in Health Maintenance) for Rebecca meets the Care Plan requirement. This Care Plan has been established and reviewed with the Patient.    Counseling Resources:  ATP IV Guidelines  Pooled Cohorts Equation Calculator  Breast Cancer Risk Calculator  FRAX Risk " Assessment  ICSI Preventive Guidelines  Dietary Guidelines for Americans, 2010  USDA's MyPlate  ASA Prophylaxis  Lung CA Screening    Marco Jones MD  [facility]

## 2018-05-10 NOTE — PATIENT INSTRUCTIONS
Get the new shingles shot, shingrx    Marco Jones M.D.                Preventive Health Recommendations    Female Ages 65 +    Yearly exam:     See your health care provider every year in order to  o Review health changes.   o Discuss preventive care.    o Review your medicines if your doctor has prescribed any.      You no longer need a yearly Pap test unless you've had an abnormal Pap test in the past 10 years. If you have vaginal symptoms, such as bleeding or discharge, be sure to talk with your provider about a Pap test.      Every 1 to 2 years, have a mammogram.  If you are over 69, talk with your health care provider about whether or not you want to continue having screening mammograms.      Every 10 years, have a colonoscopy. Or, have a yearly FIT test (stool test). These exams will check for colon cancer.       Have a cholesterol test every 5 years, or more often if your doctor advises it.       Have a diabetes test (fasting glucose) every three years. If you are at risk for diabetes, you should have this test more often.       At age 65, have a bone density scan (DEXA) to check for osteoporosis (brittle bone disease).    Shots:    Get a flu shot each year.    Get a tetanus shot every 10 years.    Talk to your doctor about your pneumonia vaccines. There are now two you should receive - Pneumovax (PPSV 23) and Prevnar (PCV 13).    Talk to your doctor about the shingles vaccine.    Talk to your doctor about the hepatitis B vaccine.    Nutrition:     Eat at least 5 servings of fruits and vegetables each day.      Eat whole-grain bread, whole-wheat pasta and brown rice instead of white grains and rice.      Talk to your provider about Calcium and Vitamin D.     Lifestyle    Exercise at least 150 minutes a week (30 minutes a day, 5 days a week). This will help you control your weight and prevent disease.      Limit alcohol to one drink per day.      No smoking.       Wear sunscreen to prevent skin cancer.        See your dentist twice a year for an exam and cleaning.      See your eye doctor every 1 to 2 years to screen for conditions such as glaucoma, macular degeneration and cataracts.

## 2018-05-10 NOTE — MR AVS SNAPSHOT
After Visit Summary   5/10/2018    Rebecca Prieto    MRN: [id number]           Patient Information     Date Of Birth          1945        Visit Information        Provider Department      5/10/2018 9:00 AM Marco Jones MD [facility]        Today's Diagnoses     Routine general medical examination at a health care facility    -  1    Vitamin D deficiency        Osteopenia, unspecified location        Other migraine without status migrainosus, not intractable        Hyperlipidemia LDL goal <160        Adenomatous polyp of colon, unspecified part of colon        Spinal stenosis, unspecified spinal region          Care Instructions    Get the new shingles shot, shingrx    Marco Jones M.D.                Preventive Health Recommendations    Female Ages 65 +    Yearly exam:     See your health care provider every year in order to  o Review health changes.   o Discuss preventive care.    o Review your medicines if your doctor has prescribed any.      You no longer need a yearly Pap test unless you've had an abnormal Pap test in the past 10 years. If you have vaginal symptoms, such as bleeding or discharge, be sure to talk with your provider about a Pap test.      Every 1 to 2 years, have a mammogram.  If you are over 69, talk with your health care provider about whether or not you want to continue having screening mammograms.      Every 10 years, have a colonoscopy. Or, have a yearly FIT test (stool test). These exams will check for colon cancer.       Have a cholesterol test every 5 years, or more often if your doctor advises it.       Have a diabetes test (fasting glucose) every three years. If you are at risk for diabetes, you should have this test more often.       At age 65, have a bone density scan (DEXA) to check for osteoporosis (brittle bone disease).    Shots:    Get a flu shot each year.    Get a tetanus shot every 10 years.    Talk to your doctor about your pneumonia  "vaccines. There are now two you should receive - Pneumovax (PPSV 23) and Prevnar (PCV 13).    Talk to your doctor about the shingles vaccine.    Talk to your doctor about the hepatitis B vaccine.    Nutrition:     Eat at least 5 servings of fruits and vegetables each day.      Eat whole-grain bread, whole-wheat pasta and brown rice instead of white grains and rice.      Talk to your provider about Calcium and Vitamin D.     Lifestyle    Exercise at least 150 minutes a week (30 minutes a day, 5 days a week). This will help you control your weight and prevent disease.      Limit alcohol to one drink per day.      No smoking.       Wear sunscreen to prevent skin cancer.       See your dentist twice a year for an exam and cleaning.      See your eye doctor every 1 to 2 years to screen for conditions such as glaucoma, macular degeneration and cataracts.          Follow-ups after your visit        Who to contact     If you have questions or need follow up information about today's clinic visit or your schedule please contact [facility] directly at [phone].  Normal or non-critical lab and imaging results will be communicated to you by Boondhart, letter or phone within 4 business days after the clinic has received the results. If you do not hear from us within 7 days, please contact the clinic through RealPag[employer] or phone. If you have a critical or abnormal lab result, we will notify you by phone as soon as possible.  Submit refill requests through [employer] or call your pharmacy and they will forward the refill request to us. Please allow 3 business days for your refill to be completed.          Additional Information About Your Visit        [employer] Information     [employer] lets you send messages to your doctor, view your test results, renew your prescriptions, schedule appointments and more. To sign up, go to [email or web address] . Click on \"Log in\" on the left side of the screen, which will take you to the " "Welcome page. Then click on \"Sign up Now\" on the right side of the page.     You will be asked to enter the access code listed below, as well as some personal information. Please follow the directions to create your username and password.     Your access code is: [email or web address]  Expires: 2018  4:00 PM     Your access code will  in 90 days. If you need help or a new code, please call your [place] clinic or [phone].        Care EveryWhere ID     This is your Care EveryWhere ID. This could be used by other organizations to access your [place] medical records  [id number]        Your Vitals Were     Pulse Temperature Height Pulse Oximetry Breastfeeding? BMI (Body Mass Index)    61 98.2  F (36.8  C) (Oral) 5' 7\" (1.702 m) 99% No 22.71 kg/m2       Blood Pressure from Last 3 Encounters:   05/10/18 121/74   18 108/72   17 119/73    Weight from Last 3 Encounters:   05/10/18 145 lb (65.8 kg)   18 140 lb (63.5 kg)   17 149 lb 11.2 oz (67.9 kg)              We Performed the Following     CBC with platelets     Comprehensive metabolic panel     Lipid panel reflex to direct LDL Non-fasting          Today's Medication Changes          These changes are accurate as of 5/10/18  9:15 AM.  If you have any questions, ask your nurse or doctor.               These medicines have changed or have updated prescriptions.        Dose/Directions    gabapentin 300 MG capsule   Commonly known as:  NEURONTIN   This may have changed:  See the new instructions.   Used for:  Spinal stenosis, unspecified spinal region   Changed by:  Marco Jones MD        TAKE 1 CAPSULE THREE TIMES A DAY   Quantity:  270 capsule   Refills:  3         Stop taking these medicines if you haven't already. Please contact your care team if you have questions.     azithromycin 250 MG tablet   Commonly known as:  ZITHROMAX   Stopped by:  Marco Jones MD           ondansetron 4 MG tablet   Commonly known as:  ZOFRAN "   Stopped by:  Marco Jones MD           oseltamivir 75 MG capsule   Commonly known as:  TAMIFLU   Stopped by:  Marco Jones MD           predniSONE 20 MG tablet   Commonly known as:  DELTASONE   Stopped by:  Marco Jones MD           SUMAtriptan 50 MG tablet   Commonly known as:  IMITREX   Stopped by:  Marco Jones MD                Where to get your medicines      These medications were sent to [facility] - [address]  [address] [facility] [address]     Phone:  [phone]     gabapentin 300 MG capsule                Primary Care Provider Office Phone # Fax #    Marco Jones [phone] [address] [facility] [address] [address]        Equal Access to Services     [facility]: Hadii lourdes austin hadasho Sofalguniali, waaxda luqadaha, qaybta kaalmada adeegyada, jay garcia . So [facility] [phone].    ATENCIÓN: Si habla español, tiene a hameed disposición servicios gratuitos de asistencia lingüística. [facility] [phone].    We comply with applicable federal civil rights laws and [place] laws. We do not discriminate on the basis of race, color, national origin, age, disability, sex, sexual orientation, or gender identity.            Thank you!     Thank you for choosing [facility]  for your care. Our goal is always to provide you with excellent care. Hearing back from our patients is one way we can continue to improve our services. Please take a few minutes to complete the written survey that you may receive in the mail after your visit with us. Thank you!             Your Updated Medication List - Protect others around you: Learn how to safely use, store and throw away your medicines at www.disposemymeds.org.          This list is accurate as of 5/10/18  9:15 AM.  Always use your most recent med list.                   Brand Name Dispense Instructions for use  Diagnosis    CALCIUM + D PO      Take 1 capsule by mouth daily        conjugated estrogens cream    PREMARIN     Place 1 g vaginally twice a week        gabapentin 300 MG capsule    NEURONTIN    270 capsule    TAKE 1 CAPSULE THREE TIMES A DAY    Spinal stenosis, unspecified spinal region

## 2018-05-10 NOTE — LETTER
[facility]  [address] [facility]  [address]  [place]  [address]  Tel: [phone]    May 11, 2018    Rebecca Prieto  [address] [facility] [address]        Dear MsAnderson Francismoraimadariel,    It was a pleasure seeing you for your physical examination.  I wanted to get back to you with your test results.  I have enclosed a copy for your review.       I am happy to report that your cbc or complete blood count is normal with no signs of anemia, leukemia or platelet abnormalities. Your chemistry panel shows no signs of diabetes.  Your blood salts, kidney tests, liver tests, and proteins are all fine.     Your total cholesterol is 256 with the normal range being below 200.  Your HDL or good cholesterol is very good at 81 with the normal range being above 50.  Your LDL or bad cholesterol is 163 with the normal range being below 130.  At this point I do not think we need to add any cholesterol lowering medication but I would strongly encourage you to exercise, eat a healthy diet, and keep your weight down.     I am happy to bring you this overall excellent report.  If you have any questions please let me know.      If you have any further questions or problems, please contact our office.      Sincerely,    Marco Jones MD/lukas          Enclosure: Lab Results                  Results for orders placed or performed in visit on 05/10/18   CBC with platelets   Result Value Ref Range    WBC 4.6 4.0 - 11.0 10e9/L    RBC Count 4.29 3.8 - 5.2 10e12/L    Hemoglobin 12.7 11.7 - 15.7 g/dL    Hematocrit 39.7 35.0 - 47.0 %    MCV 93 78 - 100 fl    MCH 29.6 26.5 - 33.0 pg    MCHC 32.0 31.5 - 36.5 g/dL    RDW 14.4 10.0 - 15.0 %    Platelet Count 294 150 - 450 10e9/L   Comprehensive metabolic panel   Result Value Ref Range    Sodium 143 133 - 144 mmol/L    Potassium 4.4 3.4 - 5.3 mmol/L    Chloride 107 94 - 109 mmol/L    Carbon Dioxide 27 20 - 32 mmol/L    Anion Gap 9 3 - 14 mmol/L    Glucose 85 70 - 99 mg/dL    Urea Nitrogen 19 7 -  30 mg/dL    Creatinine 0.76 0.52 - 1.04 mg/dL    GFR Estimate 75 >60 mL/min/1.7m2    GFR Estimate If Black >90 >60 mL/min/1.7m2    Calcium 9.0 8.5 - 10.1 mg/dL    Bilirubin Total 0.5 0.2 - 1.3 mg/dL    Albumin 3.8 3.4 - 5.0 g/dL    Protein Total 6.9 6.8 - 8.8 g/dL    Alkaline Phosphatase 50 40 - 150 U/L    ALT 21 0 - 50 U/L    AST 17 0 - 45 U/L   Lipid panel reflex to direct LDL Non-fasting   Result Value Ref Range    Cholesterol 256 (H) <200 mg/dL    Triglycerides 59 <150 mg/dL    HDL Cholesterol 81 >49 mg/dL    LDL Cholesterol Calculated 163 (H) <100 mg/dL    Non HDL Cholesterol 175 (H) <130 mg/dL

## 2018-05-11 NOTE — PROGRESS NOTES
It was a pleasure seeing you for your physical examination.  I wanted to get back to you with your test results.  I have enclosed a copy for your review.      I am happy to report that your cbc or complete blood count is normal with no signs of anemia, leukemia or platelet abnormalities. Your chemistry panel shows no signs of diabetes.  Your blood salts, kidney tests, liver tests, and proteins are all fine.    Your total cholesterol is 256 with the normal range being below 200.  Your HDL or good cholesterol is very good at 81 with the normal range being above 50.  Your LDL or bad cholesterol is 163 with the normal range being below 130.  At this point I do not think we need to add any cholesterol lowering medication but I would strongly encourage you to exercise, eat a healthy diet, and keep your weight down.    I am happy to bring you this overall excellent report.  If you have any questions please let me know.

## 2018-12-03 ENCOUNTER — TELEPHONE (OUTPATIENT)
Dept: FAMILY MEDICINE | Facility: CLINIC | Age: 73
End: 2018-12-03

## 2018-12-03 DIAGNOSIS — M25.552 HIP PAIN, LEFT: Primary | ICD-10-CM

## 2018-12-03 NOTE — TELEPHONE ENCOUNTER
Reason for Call: Request for an order or referral:    Order or referral being requested: referral for PT    Date needed: as soon as possible-has an appt. Scheduled 12/14.      Has the patient been seen by the PCP for this problem? YES    Additional comments: patient would like to do PT for her back and hip pain.    She has scheduled an appointment with:  [facility]  [address] [place], [place] [id number]    Phone:  [phone]    Fax:  [phone]        Phone number Patient can be reached at:  Cell number on file:    Telephone Information:   Mobile [phone]       Best Time:  anytime    Can we leave a detailed message on this number?  YES    Call taken on 12/3/2018 at 2:16 PM by Janie Ordaz.

## 2018-12-03 NOTE — TELEPHONE ENCOUNTER
Spoke with pt, she is requesting referral for PT. She state that she is having low back pain and bilateral hip pain.     She has scheduled an appointment with:  [facility]  [address] [place] [address] [address]     Phone:  [phone]     Fax:  [phone]

## 2019-01-11 ENCOUNTER — TRANSFERRED RECORDS (OUTPATIENT)
Dept: HEALTH INFORMATION MANAGEMENT | Facility: CLINIC | Age: 74
End: 2019-01-11

## 2019-01-14 ENCOUNTER — OFFICE VISIT (OUTPATIENT)
Dept: FAMILY MEDICINE | Facility: CLINIC | Age: 74
End: 2019-01-14
Payer: MEDICARE

## 2019-01-14 VITALS
SYSTOLIC BLOOD PRESSURE: 119 MMHG | HEIGHT: 67 IN | DIASTOLIC BLOOD PRESSURE: 75 MMHG | TEMPERATURE: 97.9 F | HEART RATE: 61 BPM | BODY MASS INDEX: 23.23 KG/M2 | WEIGHT: 148 LBS | OXYGEN SATURATION: 98 %

## 2019-01-14 DIAGNOSIS — M54.50 ACUTE MIDLINE LOW BACK PAIN WITHOUT SCIATICA: Primary | ICD-10-CM

## 2019-01-14 PROCEDURE — 99213 OFFICE O/P EST LOW 20 MIN: CPT | Performed by: INTERNAL MEDICINE

## 2019-01-14 ASSESSMENT — MIFFLIN-ST. JEOR: SCORE: 1218.95

## 2019-03-09 DIAGNOSIS — G43.809 OTHER MIGRAINE WITHOUT STATUS MIGRAINOSUS, NOT INTRACTABLE: ICD-10-CM

## 2019-03-11 NOTE — TELEPHONE ENCOUNTER
"  SUMAtriptan (IMITREX) 50 MG tablet 9 tablet 3 5/10/2018         Last Written Prescription Date:  05/10/2018  Last Fill Quantity: 9,  # refills: 3   Last office visit: 1/14/2019 with prescribing provider:     Future Office Visit:  Unknown    Requested Prescriptions   Pending Prescriptions Disp Refills     SUMAtriptan (IMITREX) 50 MG tablet [Pharmacy Med Name: SUMATRIPTAN TABS 9'S 50MG] 18 tablet 0     Sig: TAKE 1 TABLET AS NEEDED FOR MIGRAINE    Serotonin Agonists Failed - 3/9/2019 10:33 AM       Failed - Serotonin Agonist request needs review.    Please review patient's record. If patient has had 8 or more treatments in the past month, please forward to provider.         Passed - Blood pressure under 140/90 in past 12 months    BP Readings from Last 3 Encounters:   01/14/19 119/75   05/10/18 121/74   04/09/18 108/72                Passed - Recent (12 mo) or future (30 days) visit within the authorizing provider's specialty    Patient had office visit in the last 12 months or has a visit in the next 30 days with authorizing provider or within the authorizing provider's specialty.  See \"Patient Info\" tab in inbasket, or \"Choose Columns\" in Meds & Orders section of the refill encounter.             Passed - Medication is active on med list       Passed - Patient is age 18 or older       Passed - No active pregnancy on record       Passed - No positive pregnancy test in past 12 months          "

## 2019-03-12 RX ORDER — SUMATRIPTAN 50 MG/1
TABLET, FILM COATED ORAL
Qty: 18 TABLET | Refills: 0 | Status: SHIPPED | OUTPATIENT
Start: 2019-03-12 | End: 2019-06-23

## 2019-03-12 NOTE — TELEPHONE ENCOUNTER
Routing refill request to provider for review/approval because:  Requesting 18 tabs instead of 9.  Due for physical in May- added in to pharm comments.  Please authorize if appropriate.  Thanks,  Marisol Serna RN

## 2019-04-03 ENCOUNTER — HOSPITAL ENCOUNTER (OUTPATIENT)
Dept: MAMMOGRAPHY | Facility: CLINIC | Age: 74
Discharge: HOME | End: 2019-04-03
Attending: OBSTETRICS & GYNECOLOGY | Admitting: OBSTETRICS & GYNECOLOGY
Payer: MEDICARE

## 2019-04-03 DIAGNOSIS — Z12.31 VISIT FOR SCREENING MAMMOGRAM: ICD-10-CM

## 2019-04-03 PROCEDURE — 77063 BREAST TOMOSYNTHESIS BI: CPT

## 2019-04-05 ENCOUNTER — TRANSFERRED RECORDS (OUTPATIENT)
Dept: HEALTH INFORMATION MANAGEMENT | Facility: CLINIC | Age: 74
End: 2019-04-05

## 2019-04-15 ENCOUNTER — TRANSFERRED RECORDS (OUTPATIENT)
Dept: HEALTH INFORMATION MANAGEMENT | Facility: CLINIC | Age: 74
End: 2019-04-15

## 2019-06-14 ENCOUNTER — TRANSFERRED RECORDS (OUTPATIENT)
Dept: HEALTH INFORMATION MANAGEMENT | Facility: CLINIC | Age: 74
End: 2019-06-14

## 2019-06-23 DIAGNOSIS — G43.809 OTHER MIGRAINE WITHOUT STATUS MIGRAINOSUS, NOT INTRACTABLE: ICD-10-CM

## 2019-06-23 DIAGNOSIS — M48.00 SPINAL STENOSIS, UNSPECIFIED SPINAL REGION: ICD-10-CM

## 2019-06-24 NOTE — TELEPHONE ENCOUNTER
SUMAtriptan (IMITREX) 50 MG tablet 18 tablet 0 3/12/2019  No   Sig: TAKE 1 TABLET AS NEEDED FOR MIGRAINE     Last Written Prescription Date:  03/12/2019  Last Fill Quantity: 18,  # refills: 0   Last office visit: 1/14/2019 with prescribing provider:     Future Office Visit:   Next 5 appointments (look out 90 days)    Jul 01, 2019 10:30 AM CDT  [facility] [address] [address]  [phone]             ~~~~~~~~~~~~~~~~~~~~~~~~~~~      gabapentin (NEURONTIN) 300 MG capsule 270 capsule 3 5/10/2018  No   Sig: TAKE 1 CAPSULE THREE TIMES A DAY       Last Written Prescription Date:  05/10/2018  Last Fill Quantity: 270,  # refills: 3   Last office visit: 1/14/2019 with prescribing provider:     Future Office Visit:   Next 5 appointments (look out 90 days)    Jul 01, 2019 10:30 AM CDT  [facility] Macro [facility] [address] [address]  [phone]           .  Requested Prescriptions   Pending Prescriptions Disp Refills     gabapentin (NEURONTIN) 300 MG capsule [Pharmacy Med Name: GABAPENTIN CAPS 300MG] 270 capsule 3     Sig: TAKE 1 CAPSULE THREE TIMES A DAY       There is no refill protocol information for this order        SUMAtriptan (IMITREX) 50 MG tablet [Pharmacy Med Name: SUMATRIPTAN TABS 9'S 50MG] 18 tablet 0     Sig: TAKE 1 TABLET AS NEEDED FOR MIGRAINE (DUE FOR A PHYSICAL IN MAY)       Serotonin Agonists Failed - 6/23/2019  5:15 PM        Failed - Serotonin Agonist request needs review.     Please review patient's record. If patient has had 8 or more treatments in the past month, please forward to provider.          Passed - Blood pressure under 140/90 in past 12 months     BP Readings from Last 3 Encounters:   01/14/19 119/75   05/10/18 121/74   04/09/18 108/72                 Passed - Recent (12 mo) or future (30 days) visit  "within the authorizing provider's specialty     Patient had office visit in the last 12 months or has a visit in the next 30 days with authorizing provider or within the authorizing provider's specialty.  See \"Patient Info\" tab in inbasket, or \"Choose Columns\" in Meds & Orders section of the refill encounter.              Passed - Medication is active on med list        Passed - Patient is age 18 or older        Passed - No active pregnancy on record        Passed - No positive pregnancy test in past 12 months          "

## 2019-06-26 RX ORDER — GABAPENTIN 300 MG/1
CAPSULE ORAL
Qty: 270 CAPSULE | Refills: 3 | Status: SHIPPED | OUTPATIENT
Start: 2019-06-26 | End: 2020-06-15

## 2019-06-26 RX ORDER — SUMATRIPTAN 50 MG/1
TABLET, FILM COATED ORAL
Qty: 18 TABLET | Refills: 0 | Status: SHIPPED | OUTPATIENT
Start: 2019-06-26 | End: 2020-03-17

## 2019-07-10 ENCOUNTER — TRANSFERRED RECORDS (OUTPATIENT)
Dept: HEALTH INFORMATION MANAGEMENT | Facility: CLINIC | Age: 74
End: 2019-07-10

## 2019-07-12 ENCOUNTER — TELEPHONE (OUTPATIENT)
Dept: FAMILY MEDICINE | Facility: CLINIC | Age: 74
End: 2019-07-12

## 2019-07-12 DIAGNOSIS — M48.00 SPINAL STENOSIS, UNSPECIFIED SPINAL REGION: Primary | ICD-10-CM

## 2019-07-12 NOTE — TELEPHONE ENCOUNTER
I discussed with patient her mri, symptoms not worse, occasionally pain in legs, not t/n/w, no loss of control of b/b fxn.  Low back pain comes and goes.    PLAN:  To see spine surgeon, patient agrees.  Will refer to nsurgery clinic    Marco Jones M.D.

## 2019-08-14 ENCOUNTER — OFFICE VISIT (OUTPATIENT)
Dept: NEUROSURGERY | Facility: CLINIC | Age: 74
End: 2019-08-14
Attending: PHYSICIAN ASSISTANT
Payer: MEDICARE

## 2019-08-14 VITALS
HEART RATE: 52 BPM | HEIGHT: 67 IN | BODY MASS INDEX: 23.64 KG/M2 | TEMPERATURE: 97.8 F | RESPIRATION RATE: 16 BRPM | OXYGEN SATURATION: 99 % | DIASTOLIC BLOOD PRESSURE: 79 MMHG | WEIGHT: 150.6 LBS | SYSTOLIC BLOOD PRESSURE: 136 MMHG

## 2019-08-14 DIAGNOSIS — M48.00 SPINAL STENOSIS, UNSPECIFIED SPINAL REGION: ICD-10-CM

## 2019-08-14 PROCEDURE — 99204 OFFICE O/P NEW MOD 45 MIN: CPT | Performed by: PHYSICIAN ASSISTANT

## 2019-08-14 PROCEDURE — G0463 HOSPITAL OUTPT CLINIC VISIT: HCPCS

## 2019-08-14 RX ORDER — ACETAMINOPHEN 500 MG
500 TABLET ORAL 3 TIMES DAILY
COMMUNITY

## 2019-08-14 RX ORDER — OMEGA-3 FATTY ACIDS/FISH OIL 300-1000MG
200 CAPSULE ORAL 3 TIMES DAILY
COMMUNITY

## 2019-08-14 ASSESSMENT — PAIN SCALES - GENERAL: PAINLEVEL: NO PAIN (0)

## 2019-08-14 ASSESSMENT — MIFFLIN-ST. JEOR: SCORE: 1225.75

## 2019-08-14 NOTE — PROGRESS NOTES
Dr. Jose Nelson  [facility]  Neurosurgery Clinic Visit      CC: Low back pain    Primary care Provider: Marco Jones      Reason For Visit:   I was asked by Marco Jones MD to consult on the patient for spinal stenosis.      HPI: Rebecca Prieto is a 74 year old female who presents for evaluation of chronic low back pain x 20 years. Pain is located in bilateral low and radiates down bilateral posterior legs to the knees. Describes the pain as a constant dull ache with intermittent sharp electric pains. She states majority if her pain is in her back. Pain is worsened with gardening or any movement where she is axial loading. Pain is alleviated with lying down, gabapentin, advil, tylenol. She has been doing PT at [facility], which has also been helpful. States she does have known spondylolisthesis from ~20 years ago. Denies paresthethesias, weakness, or bladder/bowel incontinence.    Current pain: 0/10 At worst: 9/10    Past Medical History:   Diagnosis Date     Adenomatous polyp of colon 2002    fu done 2011 and to fu 2014, fu done 2014 and to fu 2019     Hypercholesteraemia      Migraines      Osteopenia 2005    fu 2009 better, -0.4 spine and -1.1 fem neck, has fu with gyn     PONV (postoperative nausea and vomiting)      Screening 2005    abd us neg for aaa due to fh     Spinal stenosis     on neurontin     Spondylolistheses      Tonsillar abscess 1992     Vitamin D deficiency 2014       Past Medical History reviewed with patient during visit.    Past Surgical History:   Procedure Laterality Date     breast biopsies      twice     HYSTERECTOMY, PAP NO LONGER INDICATED  1998    due to hyperplasia     right knee arthroscopy  1998     right [place]  2016    [place]     ROTATOR CUFF REPAIR RT/LT  2007     STRIP VEIN  2010     Past Surgical History reviewed with patient during visit.    Current Outpatient Medications   Medication     Calcium  Carbonate-Vitamin D (CALCIUM + D PO)     conjugated estrogens (PREMARIN) vaginal cream     gabapentin (NEURONTIN) 300 MG capsule     SUMAtriptan (IMITREX) 50 MG tablet     No current facility-administered medications for this visit.        Allergies   Allergen Reactions     Nickel      Succinylcholine      Gets paralyzed       Social History     Socioeconomic History     Marital status:      Spouse name: Not on file     Number of children: 3     Years of education: Not on file     Highest education level: Not on file   Occupational History     Not on file   Social Needs     Financial resource strain: Not on file     Food insecurity:     Worry: Not on file     Inability: Not on file     Transportation needs:     Medical: Not on file     Non-medical: Not on file   Tobacco Use     Smoking status: Never Smoker     Smokeless tobacco: Never Used   Substance and Sexual Activity     Alcohol use: Yes     Alcohol/week: 4.2 oz     Types: 7 Standard drinks or equivalent per week     Drug use: No     Sexual activity: Yes     Partners: Male   Lifestyle     Physical activity:     Days per week: Not on file     Minutes per session: Not on file     Stress: Not on file   Relationships     Social connections:     Talks on phone: Not on file     Gets together: Not on file     Attends Orthodox service: Not on file     Active member of club or organization: Not on file     Attends meetings of clubs or organizations: Not on file     Relationship status: Not on file     Intimate partner violence:     Fear of current or ex partner: Not on file     Emotionally abused: Not on file     Physically abused: Not on file     Forced sexual activity: Not on file   Other Topics Concern     Parent/sibling w/ CABG, MI or angioplasty before 65F 55M? Not Asked   Social History Narrative     Not on file       Family History   Problem Relation Age of Onset     Prostate Cancer Brother           ROS: 10 point ROS neg other than the symptoms noted  above in the HPI.    Vital Signs: There were no vitals taken for this visit.    Examination:  Constitutional:  Alert, well nourished, NAD.  HEENT: Normocephalic, atraumatic.   Pulmonary:  Without shortness of breath, normal effort.   Lymph: no lymphadenopathy to low back or LE.   Integumentary: Skin is free of rashes or lesions.   Cardiovascular:  No pitting edema of BLE.      Neurological:  Awake  Alert  Oriented x 3  Speech clear  Cranial nerves II - XII grossly intact  PERRL  EOMI  Face symmetric  Tongue midline  Motor exam   Hip Flexor:                Right: 5/5  Left:  5/5  Hip Adductor:             Right:  5/5  Left:  5/5  Hip Abductor:             Right:  5/5  Left:  5/5  Gastroc Soleus:        Right:  5/5  Left:  5/5  Tib/Ant:                      Right:  5/5  Left:  5/5  EHL:                          Right:  5/5  Left:  5/5       Sensation normal to bilateral lower extremities.    Reflexes are 2+ in the patellar and Achilles. There is no clonus. Downgoing Babinski.    Musculoskeletal:  Gait: Able to stand from a seated position. Normal non-antalgic, non-myelopathic gait.  Able to heel/toe walk without loss of balance  Lumbar examination reveals no tenderness of the spine or paraspinous muscles.  Hip height is symmetrical. Negative SI joint, sciatic notch or greater trochanteric tenderness to palpation bilaterally.  Straight leg raise is negative bilaterally.      Imaging:   MRI of the lumbar spine from 7/10/2019 was reviewed in the office today. Reveals L4-5 spondylolisthesis resulting in central and foraminal stenosis.    Assessment/Plan:   Rebecca Prieto is a 74 year old female who presents for evaluation of chronic low back pain x 20 years. Pain is located in bilateral low and radiates down bilateral posterior legs to the knees. Describes the pain as a constant dull ache with intermittent sharp electric pains. She states majority if her pain is in her back.  She has been doing PT at [facility], which has also been helpful. States she does have known spondylolisthesis from ~20 years ago. Lumbar MRI reviewed in office with L4-5 spondylolisthesis. No weakness or red flag symptoms. Discussed continued conservative therapy (PT, THONY) vs surgical intervention and she wishes to avoid surgery at this time. She will think about LESI and will call if she wishes to have this placed. Advised to continue with PT at this time, as it has been helpful. Patient voiced understanding and agreement.          Karma Albarran PA-C  [facility]  [address] [address]    Tel [phone]  Pager [phone]

## 2019-08-14 NOTE — NURSING NOTE
"Rebecca Prieto is a 74 year old female who presents for:  Chief Complaint   Patient presents with     Back Pain        Initial Vitals:  /79   Pulse 52   Temp 97.8  F (36.6  C) (Oral)   Resp 16   Ht 5' 7\" (1.702 m)   Wt 150 lb 9.6 oz (68.3 kg)   SpO2 99%   BMI 23.59 kg/m   Estimated body mass index is 23.59 kg/m  as calculated from the following:    Height as of this encounter: 5' 7\" (1.702 m).    Weight as of this encounter: 150 lb 9.6 oz (68.3 kg).. Body surface area is 1.8 meters squared. BP completed using cuff size: regular  No Pain (0)        Nursing Comments: consult low back pain        Julia Mobley    "

## 2019-08-14 NOTE — LETTER
8/14/2019         RE: Rebecca Prieto  [address] [place] [address]        Dear Colleague,    Thank you for referring your patient, Rebecca Prieto, to the [facility]. Please see a copy of my visit note below.    Dr. Jose Nelson  [facility]  Neurosurgery Clinic Visit      CC: Low back pain    Primary care Provider: Marco Jones      Reason For Visit:   I was asked by Marco Jones MD to consult on the patient for spinal stenosis.      HPI: Rebecca Prieto is a 74 year old female who presents for evaluation of chronic low back pain x 20 years. Pain is located in bilateral low and radiates down bilateral posterior legs to the knees. Describes the pain as a constant dull ache with intermittent sharp electric pains. She states majority if her pain is in her back. Pain is worsened with gardening or any movement where she is axial loading. Pain is alleviated with lying down, gabapentin, advil, tylenol. She has been doing PT at [facility], which has also been helpful. States she does have known spondylolisthesis from ~20 years ago. Denies paresthethesias, weakness, or bladder/bowel incontinence.    Current pain: 0/10 At worst: 9/10    Past Medical History:   Diagnosis Date     Adenomatous polyp of colon 2002    fu done 2011 and to fu 2014, fu done 2014 and to fu 2019     Hypercholesteraemia      Migraines      Osteopenia 2005    fu 2009 better, -0.4 spine and -1.1 fem neck, has fu with gyn     PONV (postoperative nausea and vomiting)      Screening 2005    abd us neg for aaa due to fh     Spinal stenosis     on neurontin     Spondylolistheses      Tonsillar abscess 1992     Vitamin D deficiency 2014       Past Medical History reviewed with patient during visit.    Past Surgical History:   Procedure Laterality Date     breast biopsies      twice     HYSTERECTOMY, PAP NO LONGER INDICATED  1998    due to hyperplasia     right  knee arthroscopy  1998     right knee replacement  2016    [employer]     ROTATOR CUFF REPAIR RT/LT  2007     STRIP VEIN  2010     Past Surgical History reviewed with patient during visit.    Current Outpatient Medications   Medication     Calcium Carbonate-Vitamin D (CALCIUM + D PO)     conjugated estrogens (PREMARIN) vaginal cream     gabapentin (NEURONTIN) 300 MG capsule     SUMAtriptan (IMITREX) 50 MG tablet     No current facility-administered medications for this visit.        Allergies   Allergen Reactions     Nickel      Succinylcholine      Gets paralyzed       Social History     Socioeconomic History     Marital status:      Spouse name: Not on file     Number of children: 3     Years of education: Not on file     Highest education level: Not on file   Occupational History     Not on file   Social Needs     Financial resource strain: Not on file     Food insecurity:     Worry: Not on file     Inability: Not on file     Transportation needs:     Medical: Not on file     Non-medical: Not on file   Tobacco Use     Smoking status: Never Smoker     Smokeless tobacco: Never Used   Substance and Sexual Activity     Alcohol use: Yes     Alcohol/week: 4.2 oz     Types: 7 Standard drinks or equivalent per week     Drug use: No     Sexual activity: Yes     Partners: Male   Lifestyle     Physical activity:     Days per week: Not on file     Minutes per session: Not on file     Stress: Not on file   Relationships     Social connections:     Talks on phone: Not on file     Gets together: Not on file     Attends Confucianist service: Not on file     Active member of club or organization: Not on file     Attends meetings of clubs or organizations: Not on file     Relationship status: Not on file     Intimate partner violence:     Fear of current or ex partner: Not on file     Emotionally abused: Not on file     Physically abused: Not on file     Forced sexual activity: Not on file   Other Topics Concern     Parent/sibling  w/ CABG, MI or angioplasty before 65F 55M? Not Asked   Social History Narrative     Not on file       Family History   Problem Relation Age of Onset     Prostate Cancer Brother           ROS: 10 point ROS neg other than the symptoms noted above in the HPI.    Vital Signs: There were no vitals taken for this visit.    Examination:  Constitutional:  Alert, well nourished, NAD.  HEENT: Normocephalic, atraumatic.   Pulmonary:  Without shortness of breath, normal effort.   Lymph: no lymphadenopathy to low back or LE.   Integumentary: Skin is free of rashes or lesions.   Cardiovascular:  No pitting edema of BLE.      Neurological:  Awake  Alert  Oriented x 3  Speech clear  Cranial nerves II - XII grossly intact  PERRL  EOMI  Face symmetric  Tongue midline  Motor exam   Hip Flexor:                Right: 5/5  Left:  5/5  Hip Adductor:             Right:  5/5  Left:  5/5  Hip Abductor:             Right:  5/5  Left:  5/5  Gastroc Soleus:        Right:  5/5  Left:  5/5  Tib/Ant:                      Right:  5/5  Left:  5/5  EHL:                          Right:  5/5  Left:  5/5       Sensation normal to bilateral lower extremities.    Reflexes are 2+ in the patellar and Achilles. There is no clonus. Downgoing Babinski.    Musculoskeletal:  Gait: Able to stand from a seated position. Normal non-antalgic, non-myelopathic gait.  Able to heel/toe walk without loss of balance  Lumbar examination reveals no tenderness of the spine or paraspinous muscles.  Hip height is symmetrical. Negative SI joint, sciatic notch or greater trochanteric tenderness to palpation bilaterally.  Straight leg raise is negative bilaterally.      Imaging:   MRI of the lumbar spine from 7/10/2019 was reviewed in the office today. Reveals L4-5 spondylolisthesis resulting in central and foraminal stenosis.    Assessment/Plan:   Rebecca Prieto is a 74 year old female who presents for evaluation of chronic low back pain x 20 years. Pain is located in  bilateral low and radiates down bilateral posterior legs to the knees. Describes the pain as a constant dull ache with intermittent sharp electric pains. She states majority if her pain is in her back.  She has been doing PT at [facility], which has also been helpful. States she does have known spondylolisthesis from ~20 years ago. Lumbar MRI reviewed in office with L4-5 spondylolisthesis. No weakness or red flag symptoms. Discussed continued conservative therapy (PT, THONY) vs surgical intervention and she wishes to avoid surgery at this time. She will think about LESI and will call if she wishes to have this placed. Advised to continue with PT at this time, as it has been helpful. Patient voiced understanding and agreement.          Karma Albarran PA-C  [facility]  [address] [address]    Tel [phone]  Pager [phone]      Again, thank you for allowing me to participate in the care of your patient.        Sincerely,        Karma Albarran PA-C

## 2019-08-14 NOTE — PATIENT INSTRUCTIONS
Please call if you wish to have injection ordered at [phone]    Please contact the clinic if pain worsens at [phone].

## 2019-08-21 ENCOUNTER — TELEPHONE (OUTPATIENT)
Dept: FAMILY MEDICINE | Facility: CLINIC | Age: 74
End: 2019-08-21

## 2019-08-21 DIAGNOSIS — M48.00 SPINAL STENOSIS, UNSPECIFIED SPINAL REGION: Primary | ICD-10-CM

## 2019-08-21 NOTE — TELEPHONE ENCOUNTER
Referral faxed over to [facility] and filed in the accordion folder on Diane side.    Wood Ramirez CMA on 8/21/2019 at 5:08 PM

## 2019-08-21 NOTE — TELEPHONE ENCOUNTER
Reason for Call:  Other referral    Detailed comments: Patient needs a referral to the Neurosurgeon    Stephen Salyer  [facility]    [phone]-fax  [phone]- phone      Phone Number Patient can be reached at: Cell number on file:    Telephone Information:   Mobile [phone]       Best Time: anytime    Can we leave a detailed message on this number? YES    Call taken on 8/21/2019 at 3:15 PM by Elisha Mancia

## 2019-08-22 ENCOUNTER — TRANSFERRED RECORDS (OUTPATIENT)
Dept: HEALTH INFORMATION MANAGEMENT | Facility: CLINIC | Age: 74
End: 2019-08-22

## 2019-09-12 ENCOUNTER — OFFICE VISIT (OUTPATIENT)
Dept: FAMILY MEDICINE | Facility: CLINIC | Age: 74
End: 2019-09-12
Payer: MEDICARE

## 2019-09-12 VITALS
SYSTOLIC BLOOD PRESSURE: 133 MMHG | DIASTOLIC BLOOD PRESSURE: 87 MMHG | OXYGEN SATURATION: 99 % | HEART RATE: 69 BPM | BODY MASS INDEX: 23.81 KG/M2 | TEMPERATURE: 97.4 F | WEIGHT: 152 LBS

## 2019-09-12 DIAGNOSIS — M48.00 SPINAL STENOSIS, UNSPECIFIED SPINAL REGION: Primary | ICD-10-CM

## 2019-09-12 PROCEDURE — 99213 OFFICE O/P EST LOW 20 MIN: CPT | Performed by: INTERNAL MEDICINE

## 2019-09-12 NOTE — PROGRESS NOTES
The patient presents for ongoing evaluation of her low back pain.  As noted, I last saw her for this in January.  Since then she had an MRI done at [facility] and see neurosurgery.  She was not terribly happy with that evaluation.  They recommended therapy or an injection.  She now has an appointment with a different neurosurgeon in 2 weeks.  She also sees Dr. Tavo STREETER I.    The patient is a same as his blood pressure is mostly in the low back.  She does not have leg tingling numbness or weakness.    Past Medical History:   Diagnosis Date     Adenomatous polyp of colon 2002    fu done 2011 and to fu 2014, fu done 2014 and to fu 2019     Hypercholesteraemia      Migraines      Osteopenia 2005    fu 2009 better, -0.4 spine and -1.1 fem neck, has fu with gyn     PONV (postoperative nausea and vomiting)      Screening 2005    abd us neg for aaa due to fh     Spinal stenosis     on neurontin, mri done 6/19 at [facility]     Spondylolistheses      Tonsillar abscess 1992     Vitamin D deficiency 2014     Past Surgical History:   Procedure Laterality Date     breast biopsies      twice     HYSTERECTOMY, PAP NO LONGER INDICATED  1998    due to hyperplasia     right knee arthroscopy  1998     right knee replacement  2016    [employer]     ROTATOR CUFF REPAIR RT/LT  2007     STRIP VEIN  2010     Social History     Socioeconomic History     Marital status:      Spouse name: Not on file     Number of children: 3     Years of education: Not on file     Highest education level: Not on file   Occupational History     Not on file   Social Needs     Financial resource strain: Not on file     Food insecurity:     Worry: Not on file     Inability: Not on file     Transportation needs:     Medical: Not on file     Non-medical: Not on file   Tobacco Use     Smoking status: Never Smoker     Smokeless tobacco: Never Used   Substance and Sexual Activity     Alcohol use: Yes     Alcohol/week: 4.2 oz     Types: 7 Standard drinks or equivalent per week      Drug use: No     Sexual activity: Yes     Partners: Male   Lifestyle     Physical activity:     Days per week: Not on file     Minutes per session: Not on file     Stress: Not on file   Relationships     Social connections:     Talks on phone: Not on file     Gets together: Not on file     Attends Druze service: Not on file     Active member of club or organization: Not on file     Attends meetings of clubs or organizations: Not on file     Relationship status: Not on file     Intimate partner violence:     Fear of current or ex partner: Not on file     Emotionally abused: Not on file     Physically abused: Not on file     Forced sexual activity: Not on file   Other Topics Concern     Parent/sibling w/ CABG, MI or angioplasty before 65F 55M? Not Asked   Social History Narrative     Not on file     Current Outpatient Medications   Medication Sig Dispense Refill     acetaminophen (TYLENOL) 500 MG tablet Take 500-1,000 mg by mouth every 8 hours as needed for mild pain       Calcium Carbonate-Vitamin D (CALCIUM + D PO) Take 1 capsule by mouth daily        conjugated estrogens (PREMARIN) vaginal cream Place 1 g vaginally twice a week       gabapentin (NEURONTIN) 300 MG capsule TAKE 1 CAPSULE THREE TIMES A  capsule 3     ibuprofen (ADVIL/MOTRIN) 200 MG capsule Take 200 mg by mouth every 8 hours as needed for fever       SUMAtriptan (IMITREX) 50 MG tablet TAKE 1 TABLET AS NEEDED FOR MIGRAINE (DUE FOR A PHYSICAL IN MAY) 18 tablet 0     Allergies   Allergen Reactions     Nickel      Succinylcholine      Gets paralyzed     FAMILY HISTORY NOTED AND REVIEWED    REVIEW OF SYSTEMS: above    PHYSICAL EXAM    /87 (BP Location: Left arm, Cuff Size: Adult Regular)   Pulse 69   Temp 97.4  F (36.3  C) (Tympanic)   Wt 68.9 kg (152 lb)   SpO2 99%   BMI 23.81 kg/m      Patient appears non toxic  No exam but discussed with patient over 15 min    ASSESSMENT:  1. Spinal stenosis with chronic low back pain.  She does  have an appointment to see a different neurosurgeon in 2 weeks and I think that is very reasonable.  She can continue physical therapy which she has been doing.  Certainly an injection may be useful and she will follow-up with Dr. Malik.  She will call me if she gets worse or I can help.    Time spent approximately 15 minutes with the patient all discussion.i    Marco Jones M.D.

## 2019-10-03 ENCOUNTER — TRANSFERRED RECORDS (OUTPATIENT)
Dept: HEALTH INFORMATION MANAGEMENT | Facility: CLINIC | Age: 74
End: 2019-10-03

## 2020-02-06 ENCOUNTER — TRANSFERRED RECORDS (OUTPATIENT)
Dept: HEALTH INFORMATION MANAGEMENT | Facility: CLINIC | Age: 75
End: 2020-02-06

## 2020-03-05 ENCOUNTER — TRANSFERRED RECORDS (OUTPATIENT)
Dept: HEALTH INFORMATION MANAGEMENT | Facility: CLINIC | Age: 75
End: 2020-03-05

## 2020-03-17 DIAGNOSIS — G43.809 OTHER MIGRAINE WITHOUT STATUS MIGRAINOSUS, NOT INTRACTABLE: ICD-10-CM

## 2020-03-17 RX ORDER — SUMATRIPTAN 50 MG/1
TABLET, FILM COATED ORAL
Qty: 18 TABLET | Refills: 11 | Status: SHIPPED | OUTPATIENT
Start: 2020-03-17 | End: 2022-11-21

## 2020-03-17 NOTE — TELEPHONE ENCOUNTER
"Requested Prescriptions   Pending Prescriptions Disp Refills     SUMAtriptan (IMITREX) 50 MG tablet [Pharmacy Med Name: SUMATRIPTAN TABS 9'S 50MG]  Last Written Prescription Date:  6/26/19  Last Fill Quantity: 18,  # refills: 0   Last office visit: 9/12/2019 with prescribing provider:  Robert   Future Office Visit:     18 tablet 11     Sig: TAKE 1 TABLET AS NEEDED FOR MIGRAINE (DUE FOR A PHYSICAL IN MAY)       Serotonin Agonists Failed - 3/17/2020  8:18 AM        Failed - Serotonin Agonist request needs review.     Please review patient's record. If patient has had 8 or more treatments in the past month, please forward to provider.          Passed - Blood pressure under 140/90 in past 12 months     BP Readings from Last 3 Encounters:   09/12/19 133/87   08/14/19 136/79   01/14/19 119/75                 Passed - Recent (12 mo) or future (30 days) visit within the authorizing provider's specialty     Patient has had an office visit with the authorizing provider or a provider within the authorizing providers department within the previous 12 mos or has a future within next 30 days. See \"Patient Info\" tab in inbasket, or \"Choose Columns\" in Meds & Orders section of the refill encounter.              Passed - Medication is active on med list        Passed - Patient is age 18 or older        Passed - No active pregnancy on record        Passed - No positive pregnancy test in past 12 months             "

## 2020-06-01 ENCOUNTER — TRANSFERRED RECORDS (OUTPATIENT)
Dept: HEALTH INFORMATION MANAGEMENT | Facility: CLINIC | Age: 75
End: 2020-06-01

## 2020-07-13 ENCOUNTER — HOSPITAL ENCOUNTER (OUTPATIENT)
Dept: MAMMOGRAPHY | Facility: CLINIC | Age: 75
Discharge: HOME | End: 2020-07-13
Attending: OBSTETRICS & GYNECOLOGY | Admitting: OBSTETRICS & GYNECOLOGY
Payer: MEDICARE

## 2020-07-13 DIAGNOSIS — Z12.31 VISIT FOR SCREENING MAMMOGRAM: ICD-10-CM

## 2020-07-13 PROCEDURE — 77067 SCR MAMMO BI INCL CAD: CPT

## 2020-09-24 DIAGNOSIS — M48.00 SPINAL STENOSIS, UNSPECIFIED SPINAL REGION: ICD-10-CM

## 2020-09-25 ENCOUNTER — TELEPHONE (OUTPATIENT)
Dept: FAMILY MEDICINE | Facility: CLINIC | Age: 75
End: 2020-09-25

## 2020-09-25 DIAGNOSIS — Z00.00 ROUTINE GENERAL MEDICAL EXAMINATION AT A HEALTH CARE FACILITY: ICD-10-CM

## 2020-09-25 DIAGNOSIS — E78.5 HYPERLIPIDEMIA LDL GOAL <160: Primary | ICD-10-CM

## 2020-09-25 RX ORDER — GABAPENTIN 300 MG/1
CAPSULE ORAL
Qty: 270 CAPSULE | Refills: 3 | Status: SHIPPED | OUTPATIENT
Start: 2020-09-25 | End: 2020-10-23

## 2020-09-25 NOTE — TELEPHONE ENCOUNTER
Future visit scheduled     Gabapentin 300mg       Last Written Prescription Date:  6/15/2020   Last Fill Quantity: 270,   # refills: 0  Last Office Visit: 9/12/19  Future Office visit:       Routing refill request to provider for review/approval because:  Drug not on the [facility], [facility] or [facility] refill protocol or controlled substance    Kathrine EDOUARD RN

## 2020-09-25 NOTE — TELEPHONE ENCOUNTER
Labs ordered    Juan Manuel Cheng MD, MD   
TO PCP (ok to leave for PCP)    Pt scheduled lab only visit for 10/19/2020 - fasting     Scheduled video visit for 10/23/2020     Requesting pre-visit lab orders to review labs at video visit     Thank you  Kathrine EDOUARD RN    
You may return to your regular diet.

## 2020-10-19 DIAGNOSIS — Z00.00 ROUTINE GENERAL MEDICAL EXAMINATION AT A HEALTH CARE FACILITY: ICD-10-CM

## 2020-10-19 DIAGNOSIS — E78.5 HYPERLIPIDEMIA LDL GOAL <160: ICD-10-CM

## 2020-10-19 LAB
ALBUMIN SERPL-MCNC: 3.6 G/DL (ref 3.4–5)
ALP SERPL-CCNC: 45 U/L (ref 40–150)
ALT SERPL W P-5'-P-CCNC: 24 U/L (ref 0–50)
ANION GAP SERPL CALCULATED.3IONS-SCNC: 5 MMOL/L (ref 3–14)
AST SERPL W P-5'-P-CCNC: 20 U/L (ref 0–45)
BILIRUB SERPL-MCNC: 0.4 MG/DL (ref 0.2–1.3)
BUN SERPL-MCNC: 21 MG/DL (ref 7–30)
CALCIUM SERPL-MCNC: 9 MG/DL (ref 8.5–10.1)
CHLORIDE SERPL-SCNC: 106 MMOL/L (ref 94–109)
CHOLEST SERPL-MCNC: 233 MG/DL
CO2 SERPL-SCNC: 27 MMOL/L (ref 20–32)
CREAT SERPL-MCNC: 0.84 MG/DL (ref 0.52–1.04)
ERYTHROCYTE [DISTWIDTH] IN BLOOD BY AUTOMATED COUNT: 13.7 % (ref 10–15)
GFR SERPL CREATININE-BSD FRML MDRD: 69 ML/MIN/{1.73_M2}
GLUCOSE SERPL-MCNC: 84 MG/DL (ref 70–99)
HCT VFR BLD AUTO: 39.6 % (ref 35–47)
HDLC SERPL-MCNC: 71 MG/DL
HGB BLD-MCNC: 12.9 G/DL (ref 11.7–15.7)
LDLC SERPL CALC-MCNC: 142 MG/DL
MCH RBC QN AUTO: 29.7 PG (ref 26.5–33)
MCHC RBC AUTO-ENTMCNC: 32.6 G/DL (ref 31.5–36.5)
MCV RBC AUTO: 91 FL (ref 78–100)
NONHDLC SERPL-MCNC: 162 MG/DL
PLATELET # BLD AUTO: 299 10E9/L (ref 150–450)
POTASSIUM SERPL-SCNC: 4.2 MMOL/L (ref 3.4–5.3)
PROT SERPL-MCNC: 6.9 G/DL (ref 6.8–8.8)
RBC # BLD AUTO: 4.35 10E12/L (ref 3.8–5.2)
SODIUM SERPL-SCNC: 138 MMOL/L (ref 133–144)
TRIGL SERPL-MCNC: 98 MG/DL
WBC # BLD AUTO: 5.1 10E9/L (ref 4–11)

## 2020-10-19 PROCEDURE — 80053 COMPREHEN METABOLIC PANEL: CPT | Performed by: INTERNAL MEDICINE

## 2020-10-19 PROCEDURE — 85027 COMPLETE CBC AUTOMATED: CPT | Performed by: INTERNAL MEDICINE

## 2020-10-19 PROCEDURE — 36415 COLL VENOUS BLD VENIPUNCTURE: CPT | Performed by: INTERNAL MEDICINE

## 2020-10-19 PROCEDURE — 80061 LIPID PANEL: CPT | Performed by: INTERNAL MEDICINE

## 2020-10-23 ENCOUNTER — VIRTUAL VISIT (OUTPATIENT)
Dept: FAMILY MEDICINE | Facility: CLINIC | Age: 75
End: 2020-10-23
Payer: MEDICARE

## 2020-10-23 DIAGNOSIS — G43.809 OTHER MIGRAINE WITHOUT STATUS MIGRAINOSUS, NOT INTRACTABLE: ICD-10-CM

## 2020-10-23 DIAGNOSIS — E55.9 VITAMIN D DEFICIENCY: ICD-10-CM

## 2020-10-23 DIAGNOSIS — M48.00 SPINAL STENOSIS, UNSPECIFIED SPINAL REGION: ICD-10-CM

## 2020-10-23 DIAGNOSIS — D12.6 ADENOMATOUS POLYP OF COLON, UNSPECIFIED PART OF COLON: Primary | ICD-10-CM

## 2020-10-23 PROCEDURE — 99213 OFFICE O/P EST LOW 20 MIN: CPT | Mod: 95 | Performed by: INTERNAL MEDICINE

## 2020-10-23 RX ORDER — GABAPENTIN 300 MG/1
CAPSULE ORAL
Qty: 270 CAPSULE | Refills: 3 | Status: SHIPPED | OUTPATIENT
Start: 2020-10-23 | End: 2021-12-09

## 2020-10-23 NOTE — PROGRESS NOTES
"Rebecca Prieto is a 75 year old female who is being evaluated via a billable video visit.      The patient has been notified of following:     \"This video visit will be conducted via a call between you and your physician/provider. We have found that certain health care needs can be provided without the need for an in-person physical exam.  This service lets us provide the care you need with a video conversation.  If a prescription is necessary we can send it directly to your pharmacy.  If lab work is needed we can place an order for that and you can then stop by our lab to have the test done at a later time.    Video visits are billed at different rates depending on your insurance coverage.  Please reach out to your insurance provider with any questions.    If during the course of the call the physician/provider feels a video visit is not appropriate, you will not be charged for this service.\"    Patient has given verbal consent for Video visit? Yes  How would you like to obtain your AVS? MyChart  If you are dropped from the video visit, the video invite should be resent to: Text to cell phone: [phone]  Will anyone else be joining your video visit? No      Subjective     Rebecca Prieto is a 75 year old female who presents today via video visit for the following health issues:    The patient presents for follow-up to her medical issues.  Overall she is doing quite well.  Her spinal stenosis is been stable.  She is exercising but it might be more difficult than the winter.  No cardiovascular, respiratory or GI issues.  She did have labs done which are reviewed with her.      Video Start Time: 12:13 PM      Vitals:  No vitals were obtained today due to virtual visit.    Physical Exam     GENERAL: Healthy, alert and no distress  EYES: Eyes grossly normal to inspection.  No discharge or erythema, or obvious scleral/conjunctival abnormalities.  RESP: No audible wheeze, cough, or visible cyanosis.  No visible " retractions or increased work of breathing.    SKIN: Visible skin clear. No significant rash, abnormal pigmentation or lesions.  NEURO: Cranial nerves grossly intact.  Mentation and speech appropriate for age.  PSYCH: Mentation appears normal, affect normal/bright, judgement and insight intact, normal speech and appearance well-groomed.    Past Medical History:   Diagnosis Date     Adenomatous polyp of colon 2002    fu done 2011 and to fu 2014, fu done 2014 and to fu 2019, fu done 2020 and to fu 5 years     Hypercholesteraemia      Migraines      Osteopenia 2005    fu 2009 better, -0.4 spine and -1.1 fem neck, has fu with gyn; had fu dexa with gyn 2020     PONV (postoperative nausea and vomiting)      Screening 2005    abd us neg for aaa due to fh     Spinal stenosis     on neurontin, mri done 6/19 at [facility], seen by nsurgery x 2     Spondylolistheses      Tonsillar abscess 1992     Vitamin D deficiency 2014     Past Surgical History:   Procedure Laterality Date     breast biopsies      twice     HYSTERECTOMY, PAP NO LONGER INDICATED  1998    due to hyperplasia     right knee arthroscopy  1998     right knee replacement  2016    [employer]     ROTATOR CUFF REPAIR RT/LT  2007     STRIP VEIN  2010     Social History     Socioeconomic History     Marital status:      Spouse name: Not on file     Number of children: 3     Years of education: Not on file     Highest education level: Not on file   Occupational History     Not on file   Social Needs     Financial resource strain: Not on file     Food insecurity     Worry: Not on file     Inability: Not on file     Transportation needs     Medical: Not on file     Non-medical: Not on file   Tobacco Use     Smoking status: Never Smoker     Smokeless tobacco: Never Used   Substance and Sexual Activity     Alcohol use: Yes     Alcohol/week: 7.0 standard drinks     Types: 7 Standard drinks or equivalent per week     Drug use: No     Sexual activity: Yes     Partners: Male    Lifestyle     Physical activity     Days per week: Not on file     Minutes per session: Not on file     Stress: Not on file   Relationships     Social connections     Talks on phone: Not on file     Gets together: Not on file     Attends Islam service: Not on file     Active member of club or organization: Not on file     Attends meetings of clubs or organizations: Not on file     Relationship status: Not on file     Intimate partner violence     Fear of current or ex partner: Not on file     Emotionally abused: Not on file     Physically abused: Not on file     Forced sexual activity: Not on file   Other Topics Concern     Parent/sibling w/ CABG, MI or angioplasty before 65F 55M? Not Asked   Social History Narrative     Not on file     Current Outpatient Medications   Medication Sig Dispense Refill     acetaminophen (TYLENOL) 500 MG tablet Take 500-1,000 mg by mouth every 8 hours as needed for mild pain       Calcium Carbonate-Vitamin D (CALCIUM + D PO) Take 1 capsule by mouth daily        conjugated estrogens (PREMARIN) vaginal cream Place 1 g vaginally twice a week       gabapentin (NEURONTIN) 300 MG capsule TAKE 1 CAPSULE THREE TIMES A  capsule 3     ibuprofen (ADVIL/MOTRIN) 200 MG capsule Take 200 mg by mouth every 8 hours as needed for fever       SUMAtriptan (IMITREX) 50 MG tablet TAKE 1 TABLET AS NEEDED FOR MIGRAINE (DUE FOR A PHYSICAL IN MAY) 18 tablet 11     Allergies   Allergen Reactions     Nickel      Succinylcholine      Gets paralyzed     FAMILY HISTORY NOTED AND REVIEWED    REVIEW OF SYSTEMS: above    PHYSICAL EXAM    There were no vitals taken for this visit.    Patient appears non toxic    Labs reviewed with patient     ASSESSMENT:  1. Spinal stenosis, stable  2. Elevated cholesterol, good ratio, exercise, diet  3. Osteopenia, follow up gyn  4. Colon polyp, up to date on follow up  5. Migraines, no issues  6. Vit d defic, take vit d  7. Health care maintenance    PLAN:  Flu shot at  pharm  Exercise  Call if problems    Marco Jones M.D.                Video-Visit Details    Type of service:  Video Visit    Video End Time:12:22 PM    Originating Location (pt. Location): Home    Distant Location (provider location):  [facility]     Platform used for Video Visit: Genet

## 2021-01-13 ENCOUNTER — TRANSFERRED RECORDS (OUTPATIENT)
Dept: HEALTH INFORMATION MANAGEMENT | Facility: CLINIC | Age: 76
End: 2021-01-13

## 2021-01-14 ENCOUNTER — HEALTH MAINTENANCE LETTER (OUTPATIENT)
Age: 76
End: 2021-01-14

## 2021-01-26 ENCOUNTER — TRANSFERRED RECORDS (OUTPATIENT)
Dept: HEALTH INFORMATION MANAGEMENT | Facility: CLINIC | Age: 76
End: 2021-01-26

## 2021-02-16 ENCOUNTER — VIRTUAL VISIT (OUTPATIENT)
Dept: FAMILY MEDICINE | Facility: CLINIC | Age: 76
End: 2021-02-16
Payer: MEDICARE

## 2021-02-16 ENCOUNTER — TRANSFERRED RECORDS (OUTPATIENT)
Dept: HEALTH INFORMATION MANAGEMENT | Facility: CLINIC | Age: 76
End: 2021-02-16

## 2021-02-16 DIAGNOSIS — M85.851 OSTEOPENIA OF BOTH HIPS: Primary | ICD-10-CM

## 2021-02-16 DIAGNOSIS — M85.852 OSTEOPENIA OF BOTH HIPS: Primary | ICD-10-CM

## 2021-02-16 PROCEDURE — 99213 OFFICE O/P EST LOW 20 MIN: CPT | Mod: 95 | Performed by: INTERNAL MEDICINE

## 2021-02-16 RX ORDER — ALENDRONATE SODIUM 70 MG/1
70 TABLET ORAL
Qty: 12 TABLET | Refills: 3 | Status: SHIPPED | OUTPATIENT
Start: 2021-02-16 | End: 2022-11-21

## 2021-02-16 NOTE — PROGRESS NOTES
Chayo is a 76 year old who is being evaluated via a billable video visit.      How would you like to obtain your AVS? Mail a copy  If the video visit is dropped, the invitation should be resent by: Text to cell phone: [phone]  Will anyone else be joining your video visit? No      Video Start Time: 3:36 PM        Subjective   Chayo is a 76 year old who presents for the following health issues     The patient is seen by video visit for her osteopenia.  The bone density scan from March 2020 was reviewed in the media tab.    The patient has never been on medication for bone density other than calcium and vitamin D.  She is not a smoker or drinker and does exercise.  She has a family history of fractures.  Her FRAX score is noted which I discussed with the patient.        Vitals:  No vitals were obtained today due to virtual visit.    Physical Exam   GENERAL: Healthy, alert and no distress  EYES: Eyes grossly normal to inspection.  No discharge or erythema, or obvious scleral/conjunctival abnormalities.  RESP: No audible wheeze, cough, or visible cyanosis.  No visible retractions or increased work of breathing.    SKIN: Visible skin clear. No significant rash, abnormal pigmentation or lesions.  NEURO: Cranial nerves grossly intact.  Mentation and speech appropriate for age.  PSYCH: Mentation appears normal, affect normal/bright, judgement and insight intact, normal speech and appearance well-groomed.      ASSESSMENT:  Osteopenia with fax score of 10 in hip and 22 in in spine.  Given given her risk I do think it would be beneficial to add prescription medication in addition to calcium and vitamin D, and regular aerobic exercise.  I discussed with her the bisphosphonates as well as Prolia, the risks, side effects, and data on it.  I explained how she would use the Fosamax in detail.  At this time she agrees and will start that and will call if any side effects.          Video-Visit Details    Type of service:  Video  Visit    Video End Time:3:57 PM    Originating Location (pt. Location): Home    Distant Location (provider location):  [facility]     Platform used for Video Visit: Genet

## 2021-07-23 ENCOUNTER — HOSPITAL ENCOUNTER (OUTPATIENT)
Dept: MAMMOGRAPHY | Facility: CLINIC | Age: 76
Discharge: HOME | End: 2021-07-23
Attending: OBSTETRICS & GYNECOLOGY | Admitting: OBSTETRICS & GYNECOLOGY
Payer: MEDICARE

## 2021-07-23 DIAGNOSIS — Z12.31 VISIT FOR SCREENING MAMMOGRAM: ICD-10-CM

## 2021-07-23 PROCEDURE — 77063 BREAST TOMOSYNTHESIS BI: CPT

## 2021-10-23 ENCOUNTER — HEALTH MAINTENANCE LETTER (OUTPATIENT)
Age: 76
End: 2021-10-23

## 2021-11-30 ENCOUNTER — TELEPHONE (OUTPATIENT)
Dept: FAMILY MEDICINE | Facility: CLINIC | Age: 76
End: 2021-11-30
Payer: MEDICARE

## 2021-11-30 DIAGNOSIS — M54.9 BACK PAIN, UNSPECIFIED BACK LOCATION, UNSPECIFIED BACK PAIN LATERALITY, UNSPECIFIED CHRONICITY: ICD-10-CM

## 2021-11-30 DIAGNOSIS — M48.00 SPINAL STENOSIS: ICD-10-CM

## 2021-11-30 DIAGNOSIS — M25.562 LEFT KNEE PAIN: Primary | ICD-10-CM

## 2021-11-30 DIAGNOSIS — M25.569 KNEE PAIN, UNSPECIFIED CHRONICITY, UNSPECIFIED LATERALITY: ICD-10-CM

## 2021-11-30 DIAGNOSIS — M25.561 RIGHT KNEE PAIN: ICD-10-CM

## 2021-11-30 NOTE — TELEPHONE ENCOUNTER
Has been seeing PT (independ PT, Vladimir Jones)     Doing exercises for back and right knee strengthening     Ran out of sessions and requesting a renewal on PT to continue therapy     Dx right knee pain and spinal stenosis     Kathrine EDOUARD, Triage RN  [facility]

## 2021-12-07 DIAGNOSIS — M48.00 SPINAL STENOSIS, UNSPECIFIED SPINAL REGION: ICD-10-CM

## 2021-12-09 RX ORDER — GABAPENTIN 300 MG/1
CAPSULE ORAL
Qty: 270 CAPSULE | Refills: 3 | Status: SHIPPED | OUTPATIENT
Start: 2021-12-09 | End: 2022-11-21

## 2021-12-10 NOTE — TELEPHONE ENCOUNTER
Routing refill request to provider for review/approval because:  Drug not on the [facility] refill protocol   Anay Song RN

## 2022-01-10 ENCOUNTER — TELEPHONE (OUTPATIENT)
Dept: FAMILY MEDICINE | Facility: CLINIC | Age: 77
End: 2022-01-10
Payer: MEDICARE

## 2022-01-10 NOTE — TELEPHONE ENCOUNTER
Pt is requesting a new referral to:  [facility].    This was ordered on 11/30 for pt.  Was never faxed over.     Referral printed, & faxed today to: [phone].    [facility].  Phone: [phone]  Fax: [phone] [address]  [address]  [place] [address]

## 2022-02-12 ENCOUNTER — HEALTH MAINTENANCE LETTER (OUTPATIENT)
Age: 77
End: 2022-02-12

## 2022-05-18 ENCOUNTER — HOSPITAL ENCOUNTER (OUTPATIENT)
Dept: ULTRASOUND IMAGING | Facility: CLINIC | Age: 77
Discharge: HOME | End: 2022-05-18
Attending: NURSE PRACTITIONER | Admitting: NURSE PRACTITIONER
Payer: MEDICARE

## 2022-05-18 ENCOUNTER — NURSE TRIAGE (OUTPATIENT)
Dept: FAMILY MEDICINE | Facility: CLINIC | Age: 77
End: 2022-05-18
Payer: MEDICARE

## 2022-05-18 ENCOUNTER — OFFICE VISIT (OUTPATIENT)
Dept: PEDIATRICS | Facility: CLINIC | Age: 77
End: 2022-05-18
Attending: INTERNAL MEDICINE
Payer: MEDICARE

## 2022-05-18 VITALS
TEMPERATURE: 97.9 F | SYSTOLIC BLOOD PRESSURE: 144 MMHG | WEIGHT: 150 LBS | BODY MASS INDEX: 23.49 KG/M2 | DIASTOLIC BLOOD PRESSURE: 86 MMHG | OXYGEN SATURATION: 100 % | HEART RATE: 67 BPM

## 2022-05-18 DIAGNOSIS — M79.661 PAIN OF RIGHT LOWER LEG: Primary | ICD-10-CM

## 2022-05-18 DIAGNOSIS — M79.661 PAIN OF RIGHT LOWER LEG: ICD-10-CM

## 2022-05-18 PROCEDURE — 93971 EXTREMITY STUDY: CPT | Mod: RT

## 2022-05-18 PROCEDURE — 99207 REFERRAL TO ACUTE AND DIAGNOSTIC SERVICES: CPT | Performed by: INTERNAL MEDICINE

## 2022-05-18 PROCEDURE — 99214 OFFICE O/P EST MOD 30 MIN: CPT | Performed by: NURSE PRACTITIONER

## 2022-05-18 NOTE — PROGRESS NOTES
"  Assessment & Plan     Pain of right lower leg  - US Lower Extremity Venous Duplex Right; Future    US negative for DVT.   Continue to monitor and f/u with PCP if pains persist or worsen.       Return in about 2 days (around 5/20/2022) for with regular provider if symptoms persist.    CAMILA Elizondo CNP  [facility] MARISABEL Domingo is a 77 year old who presents  urgently to [facility] by referral from Dr Fowler  to rule out DVT   for the following health issues     HPI     Musculoskeletal problem/pain  Onset/Duration: Yesterday 5/17  Description  Location: leg - right  Joint Swelling: no   Redness: no   Pain: mild - \"Only when I press on it\"  Warmth: no   Progression of Symptoms:  same, intermittent  Accompanying signs and symptoms:   Fevers: no   Numbness/tingling/weakness: no   History  Trauma to the area: no   Previous history of Gout: no      Aggravating factors include: none  Therapies tried and outcome:  nothing      Was stretching to grab something and felt a sharp pain in the back of her right calf.   Never red or hot.   Not swollen  Has varicose veins and they have not changed.    Sent to [facility] to r/o DVT    Review of Systems   Constitutional, HEENT, cardiovascular, pulmonary, GI, , musculoskeletal, neuro, skin, endocrine and psych systems are negative, except as otherwise noted.      Objective    BP (!) 144/86 (Patient Position: Sitting)   Pulse 67   Temp 97.9  F (36.6  C) (Oral)   Wt 68 kg (150 lb)   SpO2 100%   BMI 23.49 kg/m    Body mass index is 23.49 kg/m .  Physical Exam   GENERAL: healthy, alert and no distress  NECK: no adenopathy, no asymmetry, masses, or scars and thyroid normal to palpation  RESP: lungs clear to auscultation - no rales, rhonchi or wheezes  CV: regular rate and rhythm, normal S1 S2, no S3 or S4, no murmur, click or rub, no peripheral edema and peripheral pulses strong  ABDOMEN: soft, nontender, no hepatosplenomegaly, no masses and bowel " sounds normal  MS: no gross musculoskeletal defects noted, no edema  SKIN: no suspicious lesions or rashes  NEURO: Normal strength and tone, mentation intact and speech normal    Results for orders placed or performed during the hospital encounter of 05/18/22   US Lower Extremity Venous Duplex Right     Status: None    Narrative    [facility]  LOCATION: [facility]  DATE: 5/18/2022    EXAM: RIGHT LOWER EXTREMITY VENOUS ULTRASOUND    INDICATION: Right leg pain     TECHNIQUE: Duplex imaging was performed utilizing gray-scale,  compression, augmentation as appropriate, color-flow, Doppler waveform  analysis, and spectral Doppler imaging.    COMPARISON: None.    FINDINGS: The right common femoral, profunda femoral, femoral,  popliteal, and segmentally visualized calf veins are patent and  compressible with appropriate vascular waveforms. No deep venous  thrombus is identified.     For comparison, the left common femoral vein is patent and  compressible with normal venous waveforms.      Impression    IMPRESSION:   1.  RIGHT LEG: Negative for deep vein thrombosis.    ORLANDO FAJARDO MD         SYSTEM ID:  [id number]

## 2022-05-18 NOTE — PATIENT INSTRUCTIONS
Results for orders placed or performed during the hospital encounter of 05/18/22   US Lower Extremity Venous Duplex Right     Status: None    [facility]  LOCATION: [facility]  DATE: 5/18/2022    EXAM: RIGHT LOWER EXTREMITY VENOUS ULTRASOUND    INDICATION: Right leg pain     TECHNIQUE: Duplex imaging was performed utilizing gray-scale,  compression, augmentation as appropriate, color-flow, Doppler waveform  analysis, and spectral Doppler imaging.    COMPARISON: None.    FINDINGS: The right common femoral, profunda femoral, femoral,  popliteal, and segmentally visualized calf veins are patent and  compressible with appropriate vascular waveforms. No deep venous  thrombus is identified.     For comparison, the left common femoral vein is patent and  compressible with normal venous waveforms.      Impression    IMPRESSION:   1.  RIGHT LEG: Negative for deep vein thrombosis.    ORLANDO FAJARDO MD         SYSTEM ID:  [id number]

## 2022-05-18 NOTE — TELEPHONE ENCOUNTER
*Pt agreed to [facility]   Will head over there now    Kathrine EDOUARD Triage RN  [facility]

## 2022-05-18 NOTE — TELEPHONE ENCOUNTER
PCP recommendation - pt go to [facility] instead     Cancelled visit, pt agrees to go to      Angela Claudio RN  [facility]

## 2022-05-18 NOTE — TELEPHONE ENCOUNTER
Provider Response to 2nd Level Triage Request    I have reviewed the RN documentation. My recommendation is:  Refer to [facility] ([facility]) clinic.     Referral to Acute and Diagnostic Services  {Dr Fowler spoke to [facility] provider Jeannine Aldridge of Diagnosis services are indicated.  [facility] has been contacted at [phone] ([place]) to confirm patient acceptance.     The transition to Acute & Diagnostic Services Clinic has been discussed with patient, and she agrees with next level of care.  Patient understands that evaluation/treatment at [facility] typically takes significantly longer than in clinic/urgent care (>2 hours).    [facility] Referral placed: Yes  Patient has transportation arranged and will travel to the [facility] without delay: Yes    Patient aware not to eat or drink. did not discuss

## 2022-05-18 NOTE — TELEPHONE ENCOUNTER
"Appointments in Next Year    May 18, 2022  1:00 PM  (Arrive by 12:40 PM)  Provider Visit with Jaqui Fowler DO  [facility] ([facility] ) [phone]   Aug 08, 2022  7:30 AM  LAB with CS LAB  [facility] ([facility] ) [phone]   Aug 11, 2022  9:30 AM  (Arrive by 9:10 AM)  Annual Wellness Visit with Marco Jones MD  [facility] ([facility] ) [phone]        Called to triage - appointment notes state \"Leg pain\"     Left message for pt to call back (mobile number)     Called home number - no voicemail is set up so unable to leave a message there     Kathrine EDOUARD Triage RN  [facility]     "

## 2022-05-18 NOTE — TELEPHONE ENCOUNTER
"Pt called back     She went to  as advised.     Was told \"We don't have US equipment and we cannot check you for a DVT and you're just going to sit here without being seen for hours\"     Pt states she is very frustrated     She cancelled other appointments this morning to go to , and now can't see Dr Fowler today because appointment     Pt also was complaining about our long wait times on phones     Pt wants to know what to do now, please advise     Kathrine EDOUARD Triage RN  [facility]     "

## 2022-07-30 ENCOUNTER — HEALTH MAINTENANCE LETTER (OUTPATIENT)
Age: 77
End: 2022-07-30

## 2022-10-10 ENCOUNTER — HEALTH MAINTENANCE LETTER (OUTPATIENT)
Age: 77
End: 2022-10-10

## 2022-11-21 ENCOUNTER — OFFICE VISIT (OUTPATIENT)
Dept: FAMILY MEDICINE | Facility: CLINIC | Age: 77
End: 2022-11-21
Payer: MEDICARE

## 2022-11-21 VITALS
HEART RATE: 59 BPM | BODY MASS INDEX: 25.33 KG/M2 | DIASTOLIC BLOOD PRESSURE: 84 MMHG | RESPIRATION RATE: 16 BRPM | SYSTOLIC BLOOD PRESSURE: 142 MMHG | HEIGHT: 65 IN | WEIGHT: 152 LBS | OXYGEN SATURATION: 97 %

## 2022-11-21 DIAGNOSIS — M48.00 SPINAL STENOSIS, UNSPECIFIED SPINAL REGION: ICD-10-CM

## 2022-11-21 DIAGNOSIS — M85.852 OSTEOPENIA OF BOTH HIPS: ICD-10-CM

## 2022-11-21 DIAGNOSIS — Z00.00 MEDICARE ANNUAL WELLNESS VISIT, SUBSEQUENT: ICD-10-CM

## 2022-11-21 DIAGNOSIS — G43.809 OTHER MIGRAINE WITHOUT STATUS MIGRAINOSUS, NOT INTRACTABLE: ICD-10-CM

## 2022-11-21 DIAGNOSIS — M85.851 OSTEOPENIA OF BOTH HIPS: ICD-10-CM

## 2022-11-21 DIAGNOSIS — E78.5 HYPERLIPIDEMIA LDL GOAL <160: ICD-10-CM

## 2022-11-21 DIAGNOSIS — Z23 NEED FOR VACCINATION: ICD-10-CM

## 2022-11-21 DIAGNOSIS — E55.9 VITAMIN D DEFICIENCY: ICD-10-CM

## 2022-11-21 DIAGNOSIS — D12.6 ADENOMATOUS POLYP OF COLON, UNSPECIFIED PART OF COLON: Primary | ICD-10-CM

## 2022-11-21 PROCEDURE — 90471 IMMUNIZATION ADMIN: CPT | Performed by: INTERNAL MEDICINE

## 2022-11-21 PROCEDURE — G0439 PPPS, SUBSEQ VISIT: HCPCS | Performed by: INTERNAL MEDICINE

## 2022-11-21 PROCEDURE — 90714 TD VACC NO PRESV 7 YRS+ IM: CPT | Performed by: INTERNAL MEDICINE

## 2022-11-21 RX ORDER — GABAPENTIN 300 MG/1
CAPSULE ORAL
Qty: 270 CAPSULE | Refills: 3 | Status: SHIPPED | OUTPATIENT
Start: 2022-11-21 | End: 2024-02-02

## 2022-11-21 RX ORDER — ALENDRONATE SODIUM 70 MG/1
70 TABLET ORAL
Qty: 12 TABLET | Refills: 3 | Status: CANCELLED | OUTPATIENT
Start: 2022-11-21

## 2022-11-21 RX ORDER — SUMATRIPTAN 50 MG/1
TABLET, FILM COATED ORAL
Qty: 18 TABLET | Refills: 11 | Status: SHIPPED | OUTPATIENT
Start: 2022-11-21

## 2022-11-21 ASSESSMENT — ENCOUNTER SYMPTOMS
DIZZINESS: 0
MYALGIAS: 0
NAUSEA: 0
ABDOMINAL PAIN: 0
JOINT SWELLING: 1
HEADACHES: 0
NERVOUS/ANXIOUS: 0
WEAKNESS: 0
HEMATURIA: 0
FREQUENCY: 0
CONSTIPATION: 0
PALPITATIONS: 0
SORE THROAT: 0
PARESTHESIAS: 0
HEMATOCHEZIA: 0
DYSURIA: 0
FEVER: 0
COUGH: 0
EYE PAIN: 0
BREAST MASS: 0
SHORTNESS OF BREATH: 0
DIARRHEA: 0
CHILLS: 0
ARTHRALGIAS: 1
HEARTBURN: 0

## 2022-11-21 ASSESSMENT — ACTIVITIES OF DAILY LIVING (ADL): CURRENT_FUNCTION: NO ASSISTANCE NEEDED

## 2022-11-21 ASSESSMENT — PAIN SCALES - GENERAL: PAINLEVEL: NO PAIN (0)

## 2022-11-21 NOTE — PROGRESS NOTES
SUBJECTIVE:   Chayo is a 77 year old who presents for Preventive Visit.    She is doing quite well and works out by walking.  She has no complaints.    She is  and has kids and grandkids.  Is up-to-date with gynecology.  She never started the Fosamax.  Her bone density is being managed by gynecology.               Past Medical History:      Past Medical History:   Diagnosis Date     Adenomatous polyp of colon 2002    fu done 2011 and to fu 2014, fu done 2014 and to fu 2019, fu done 2020 and to fu 5 years     Hypercholesteraemia      Migraines      Osteopenia 2005    fu 2009 better, -0.4 spine and -1.1 fem neck, has fu with gyn; had fu dexa with gyn 2020; added fosamax 2/16/21     PONV (postoperative nausea and vomiting)      Screening 2005    abd us neg for aaa due to fh     Spinal stenosis     on neurontin, mri done 6/19 at [facility], seen by nsurgery x 2     Spondylolistheses      Tonsillar abscess 1992     Vitamin D deficiency 2014             Past Surgical History:      Past Surgical History:   Procedure Laterality Date     breast biopsies      twice     HYSTERECTOMY, PAP NO LONGER INDICATED  1998    due to hyperplasia     right knee arthroscopy  1998     right [place]     ROTATOR CUFF REPAIR RT/LT  2007     STRIP VEIN  2010             Social History:     Social History     Socioeconomic History     Marital status:      Spouse name: Not on file     Number of children: 3     Years of education: Not on file     Highest education level: Not on file   Occupational History     Not on file   Tobacco Use     Smoking status: Never     Smokeless tobacco: Never   Substance and Sexual Activity     Alcohol use: Yes     Alcohol/week: 7.0 standard drinks     Types: 7 Standard drinks or equivalent per week     Drug use: No     Sexual activity: Yes     Partners: Male   Other Topics Concern     Parent/sibling w/ CABG, MI or angioplasty before 65F 55M? Not Asked   Social History Narrative     Not  "on file     Social Determinants of Health     Financial Resource Strain: Not on file   Food Insecurity: Not on file   Transportation Needs: Not on file   Physical Activity: Not on file   Stress: Not on file   Social Connections: Not on file   Intimate Partner Violence: Not on file   Housing Stability: Not on file             Family History:   reviewed         Allergies:     Allergies   Allergen Reactions     Succinylcholine      Gets paralyzed     Nickel Rash             Medications:     Current Outpatient Medications   Medication Sig Dispense Refill     Calcium Carbonate-Vitamin D (CALCIUM + D PO) Take 1 capsule by mouth daily        conjugated estrogens (PREMARIN) vaginal cream Place 1 g vaginally twice a week       gabapentin (NEURONTIN) 300 MG capsule TAKE 1 CAPSULE THREE TIMES A DAY Strength: 300 mg 270 capsule 3     ibuprofen (ADVIL/MOTRIN) 200 MG capsule Take 200 mg by mouth every 8 hours as needed for fever       SUMAtriptan (IMITREX) 50 MG tablet TAKE 1 TABLET AS NEEDED FOR MIGRAINE (DUE FOR A PHYSICAL IN MAY) Strength: 50 mg 18 tablet 11     acetaminophen (TYLENOL) 500 MG tablet Take 500-1,000 mg by mouth every 8 hours as needed for mild pain                 Review of Systems:   The 10 point Review of Systems is negative other than noted in the HPI           Physical Exam:   Blood pressure (!) 142/84, pulse 59, resp. rate 16, height 1.66 m (5' 5.35\"), weight 68.9 kg (152 lb), SpO2 97 %, not currently breastfeeding.    Exam:  Constitutional: healthy appearing, alert and in no distress  Heent: Normocephalic. Head without obvious masses or lesions. PERRLDC, EOMI. Mouth exam within normal limits: tongue, mucous membranes, posterior pharynx all normal, no lesions or abnormalities seen.  Tm's and canals within normal limits bilaterally. Neck supple, no nuchal rigidity or masses. No supraclavicular, or cervical adenopathy. Thyroid symmetric, no masses.  Cardiovascular: Regular rate and rhythm, no murmer, rub or " "gallops.  JVP not elevated, no edema.  Carotids within normal limits bilaterally, no bruits.  Respiratory: Normal respiratory effort.  Lungs clear, normal flow, no wheezing or crackles.  Gastrointestinal: Normal active bowel sounds.   Soft, not tender, no masses, guarding or rebound.  No hepatosplenomegaly.   Musculoskeletal: extremities normal, no gross deformities noted.  Skin: no suspicious lesions or rashes   Neurologic: Mental status within normal limits.  Speech fluent.  No gross motor abnormalities and gait intact.  Psychiatric: mentation appears normal and affect normal.         Data:   Labs to be done fasting         Assessment:   1. Normal complete physical exam  2. Osteopenia, follow up gyn  3. Vit d defic, follow up labs  4. Elevated cholesterol, follow up labs  5. Spinal stenosis, stable  6. Migraines, no issues  7. Colon polyp, up to date on follow up  8. hcm         Plan:   Td shot  Letter with labs  Exercise and diet      Marco Jones M.D.              Patient has been advised of split billing requirements and indicates understanding: Yes  Are you in the first 12 months of your Medicare coverage?  No    Healthy Habits:     In general, how would you rate your overall health?  Excellent    Frequency of exercise:  4-5 days/week    Duration of exercise:  30-45 minutes    Do you usually eat at least 4 servings of fruit and vegetables a day, include whole grains    & fiber and avoid regularly eating high fat or \"junk\" foods?  Yes    Taking medications regularly:  Yes    Medication side effects:  None    Ability to successfully perform activities of daily living:  No assistance needed    Home Safety:  No safety concerns identified    Hearing Impairment:  No hearing concerns    In the past 6 months, have you been bothered by leaking of urine? Yes    In general, how would you rate your overall mental or emotional health?  Excellent      PHQ-2 Total Score: 0    Additional concerns today:  No      Have you " ever done Advance Care Planning? (For example, a Health Directive, POLST, or a discussion with a medical provider or your loved ones about your wishes): Yes, patient states has an Advance Care Planning document and will bring a copy to the clinic.       Fall risk  Fallen 2 or more times in the past year?: No  Any fall with injury in the past year?: No    Cognitive Screening   1) Repeat 3 items (Leader, Season, Table)    2) Clock draw: NORMAL  3) 3 item recall: Recalls 3 objects  Results: 3 items recalled: COGNITIVE IMPAIRMENT LESS LIKELY    Mini-CogTM Copyright S [employer]. Licensed by the author for use in [facility]; reprinted with permission (suzie@[facility]). All rights reserved.      Do you have sleep apnea, excessive snoring or daytime drowsiness?: no    Reviewed and updated as needed this visit by clinical staff   Tobacco  Allergies  Meds  Problems  Med Hx            Reviewed and updated as needed this visit by Provider    Allergies  Meds  Problems  Med Hx           Social History     Tobacco Use     Smoking status: Never     Smokeless tobacco: Never   Substance Use Topics     Alcohol use: Yes     Alcohol/week: 7.0 standard drinks     Types: 7 Standard drinks or equivalent per week     If you drink alcohol do you typically have >3 drinks per day or >7 drinks per week? No    Alcohol Use 11/21/2022   Prescreen: >3 drinks/day or >7 drinks/week? No   Prescreen: >3 drinks/day or >7 drinks/week? -   No flowsheet data found.            Current providers sharing in care for this patient include:   Patient Care Team:  Ca Davila MD as PCP - General (OB/Gyn)  Sandra Hector MD as MD (OB/Gyn)  Marco Jones MD as Assigned PCP    The following health maintenance items are reviewed in Epic and correct as of today:  Health Maintenance   Topic Date Due     ANNUAL REVIEW OF HM ORDERS  Never done     ADVANCE CARE PLANNING  Never done     COVID-19 Vaccine (5 - Booster  "for Pfizer series) 07/12/2022     DTAP/TDAP/TD IMMUNIZATION (3 - Td or Tdap) 10/11/2022     MAMMO SCREENING  07/23/2023     MEDICARE ANNUAL WELLNESS VISIT  11/21/2023     FALL RISK ASSESSMENT  11/21/2023     LIPID  10/19/2025     COLORECTAL CANCER SCREENING  02/06/2030     DEXA  03/05/2035     HEPATITIS C SCREENING  Completed     PHQ-2 (once per calendar year)  Completed     INFLUENZA VACCINE  Completed     Pneumococcal Vaccine: 65+ Years  Completed     ZOSTER IMMUNIZATION  Completed     IPV IMMUNIZATION  Aged Out     MENINGITIS IMMUNIZATION  Aged Out             Pertinent mammograms are reviewed under the imaging tab.    Review of Systems      OBJECTIVE:   BP (!) 142/84 (BP Location: Right arm, Patient Position: Sitting, Cuff Size: Adult Regular)   Pulse 59   Resp 16   Ht 1.66 m (5' 5.35\")   Wt 68.9 kg (152 lb)   SpO2 97%   BMI 25.02 kg/m   Estimated body mass index is 25.02 kg/m  as calculated from the following:    Height as of this encounter: 1.66 m (5' 5.35\").    Weight as of this encounter: 68.9 kg (152 lb).  Physical Exam          ASSESSMENT / PLAN:             COUNSELING:  Reviewed preventive health counseling, as reflected in patient instructions       Regular exercise       Healthy diet/nutrition        She reports that she has never smoked. She has never used smokeless tobacco.      Appropriate preventive services were discussed with this patient, including applicable screening as appropriate for cardiovascular disease, diabetes, osteopenia/osteoporosis, and glaucoma.  As appropriate for age/gender, discussed screening for colorectal cancer, prostate cancer, breast cancer, and cervical cancer. Checklist reviewing preventive services available has been given to the patient.    Reviewed patients plan of care and provided an AVS. The Basic Care Plan (routine screening as documented in Health Maintenance) for Rebecca meets the Care Plan requirement. This Care Plan has been established and reviewed with " the Patient.          Marco Jones MD  [facility]    Identified Health Risks:

## 2022-11-21 NOTE — NURSING NOTE
Prior to immunization administration, verified patients identity using patient s name and date of birth. Please see Immunization Activity for additional information.     Screening Questionnaire for Adult Immunization    Are you sick today?   No   Do you have allergies to medications, food, a vaccine component or latex?   No   Have you ever had a serious reaction after receiving a vaccination?   No   Do you have a long-term health problem with heart, lung, kidney, or metabolic disease (e.g., diabetes), asthma, a blood disorder, no spleen, complement component deficiency, a cochlear implant, or a spinal fluid leak?  Are you on long-term aspirin therapy?   No   Do you have cancer, leukemia, HIV/AIDS, or any other immune system problem?   No   Do you have a parent, brother, or sister with an immune system problem?   No   In the past 3 months, have you taken medications that affect  your immune system, such as prednisone, other steroids, or anticancer drugs; drugs for the treatment of rheumatoid arthritis, Crohn s disease, or psoriasis; or have you had radiation treatments?   No   Have you had a seizure, or a brain or other nervous system problem?   No   During the past year, have you received a transfusion of blood or blood    products, or been given immune (gamma) globulin or antiviral drug?   No   For women: Are you pregnant or is there a chance you could become       pregnant during the next month?   No   Have you received any vaccinations in the past 4 weeks?   No     Immunization questionnaire answers were all negative.        Per orders of Dr. Jones, injection of Td given by Jamila Jones MA. Patient instructed to remain in clinic for 15 minutes afterwards, and to report any adverse reaction to me immediately.       Screening performed by Jamila Jones MA on 11/21/2022 at 4:23 PM.

## 2022-11-26 ENCOUNTER — LAB (OUTPATIENT)
Dept: LAB | Facility: CLINIC | Age: 77
End: 2022-11-26
Payer: MEDICARE

## 2022-11-26 DIAGNOSIS — E55.9 VITAMIN D DEFICIENCY: ICD-10-CM

## 2022-11-26 DIAGNOSIS — Z00.00 MEDICARE ANNUAL WELLNESS VISIT, SUBSEQUENT: ICD-10-CM

## 2022-11-26 DIAGNOSIS — E78.5 HYPERLIPIDEMIA LDL GOAL <160: ICD-10-CM

## 2022-11-26 LAB
ALBUMIN SERPL BCG-MCNC: 4.2 G/DL (ref 3.5–5.2)
ALP SERPL-CCNC: 51 U/L (ref 35–104)
ALT SERPL W P-5'-P-CCNC: 17 U/L (ref 10–35)
ANION GAP SERPL CALCULATED.3IONS-SCNC: 16 MMOL/L (ref 7–15)
AST SERPL W P-5'-P-CCNC: 25 U/L (ref 10–35)
BILIRUB SERPL-MCNC: 0.4 MG/DL
BUN SERPL-MCNC: 21.3 MG/DL (ref 8–23)
CALCIUM SERPL-MCNC: 9.3 MG/DL (ref 8.8–10.2)
CHLORIDE SERPL-SCNC: 101 MMOL/L (ref 98–107)
CHOLEST SERPL-MCNC: 257 MG/DL
CREAT SERPL-MCNC: 0.84 MG/DL (ref 0.51–0.95)
DEPRECATED HCO3 PLAS-SCNC: 23 MMOL/L (ref 22–29)
ERYTHROCYTE [DISTWIDTH] IN BLOOD BY AUTOMATED COUNT: 13 % (ref 10–15)
GFR SERPL CREATININE-BSD FRML MDRD: 72 ML/MIN/1.73M2
GLUCOSE SERPL-MCNC: 82 MG/DL (ref 70–99)
HCT VFR BLD AUTO: 42.4 % (ref 35–47)
HDLC SERPL-MCNC: 81 MG/DL
HGB BLD-MCNC: 13.4 G/DL (ref 11.7–15.7)
LDLC SERPL CALC-MCNC: 163 MG/DL
MCH RBC QN AUTO: 29.2 PG (ref 26.5–33)
MCHC RBC AUTO-ENTMCNC: 31.6 G/DL (ref 31.5–36.5)
MCV RBC AUTO: 92 FL (ref 78–100)
NONHDLC SERPL-MCNC: 176 MG/DL
PLATELET # BLD AUTO: 278 10E3/UL (ref 150–450)
POTASSIUM SERPL-SCNC: 4.2 MMOL/L (ref 3.4–5.3)
PROT SERPL-MCNC: 6.5 G/DL (ref 6.4–8.3)
RBC # BLD AUTO: 4.59 10E6/UL (ref 3.8–5.2)
SODIUM SERPL-SCNC: 140 MMOL/L (ref 136–145)
TRIGL SERPL-MCNC: 67 MG/DL
WBC # BLD AUTO: 4.6 10E3/UL (ref 4–11)

## 2022-11-26 PROCEDURE — 85027 COMPLETE CBC AUTOMATED: CPT

## 2022-11-26 PROCEDURE — 80061 LIPID PANEL: CPT

## 2022-11-26 PROCEDURE — 80053 COMPREHEN METABOLIC PANEL: CPT

## 2022-11-26 PROCEDURE — 36415 COLL VENOUS BLD VENIPUNCTURE: CPT

## 2022-11-26 PROCEDURE — 82306 VITAMIN D 25 HYDROXY: CPT

## 2022-11-27 LAB — DEPRECATED CALCIDIOL+CALCIFEROL SERPL-MC: 40 UG/L (ref 20–75)

## 2022-11-27 NOTE — RESULT ENCOUNTER NOTE
It was nice seeing you.  You should be able to view all your labs.    Your cbc or blood count is normal with no signs of blood disorders.  Your vitamin D level is also normal.    Your chemistry panel shows no diabetes.  Your blood salts, kidney tests, liver test and proteins are also normal.    Your total cholesterol is high, but given the large amount of hdl or good cholesterol I am not concerned.  Please be sure to exerciser and eat a healthy diet for this.    I am happy to bring you this overall excellent report.  If you have any questions please call me.    Marco Jones M.D.

## 2023-01-30 ENCOUNTER — NURSE TRIAGE (OUTPATIENT)
Dept: FAMILY MEDICINE | Facility: CLINIC | Age: 78
End: 2023-01-30
Payer: MEDICARE

## 2023-01-30 NOTE — TELEPHONE ENCOUNTER
"1. LOCATION: \"Where does it hurt?\"       Top of the nose and forehead, some in cheek bones, very tight feeling; sinus headache.   2. ONSET: \"When did the sinus pain start?\"  (e.g., hours, days)         2-3 days ago for sinus symptoms. 8-9 days total of cold symptoms  3. SEVERITY: \"How bad is the pain?\"   (Scale 1-10; mild, moderate or severe)    - MODERATE (4-7): interferes with normal activities (e.g., work or school). Able to sleep    4. RECURRENT SYMPTOM: \"Have you ever had sinus problems before?\" If Yes, ask: \"When was the last time?\" and \"What happened that time?\"       Years ago.   5. NASAL CONGESTION: \"Is the nose blocked?\" If Yes, ask: \"Can you open it or must you breathe through your mouth?\"      Able breathe through nose, sometimes harder due to congestion.   6. NASAL DISCHARGE: \"Do you have discharge from your nose?\" If so ask, \"What color?\"      Green mucus from nose for the last 2-3 days.   7. FEVER: \"Do you have a fever?\" If Yes, ask: \"What is it, how was it measured, and when did it start?\"       No.   8. OTHER SYMPTOMS: \"Do you have any other symptoms?\" (e.g., sore throat, cough, earache, difficulty breathing)      No cough, no earache, ears feel congested, no sore throat.      Tried Zicam nasal spray with not much relief. Nasal washes helps temporarily.     Appointments in Next Year    Jan 31, 2023  1:30 PM  (Arrive by 1:10 PM)  Provider Visit with Marco Jones MD  [facility] ([facility] [place] ) [phone]        Reason for Disposition    Patient wants to be seen    Using nasal washes and pain medicine > 24 hours and sinus pain (lower forehead, cheekbone, or eye) persists    Additional Information    Negative: SEVERE headache and has fever    Negative: Patient sounds very sick or weak to the triager    Negative: Difficulty breathing, and not from stuffy nose (e.g., not relieved by cleaning out the nose)    Negative: SEVERE sinus pain    Negative: Severe " headache    Negative: Redness or swelling on the cheek, forehead, or around the eye    Negative: Fever > 103 F (39.4 C)    Negative: Fever > 101 F (38.3 C) and over 60 years of age    Negative: Fever > 100.0 F (37.8 C) and has diabetes mellitus or a weak immune system (e.g., HIV positive, cancer chemotherapy, organ transplant, splenectomy, chronic steroids)    Negative: Fever > 100.0 F (37.8 C) and bedridden (e.g., nursing home patient, stroke, chronic illness, recovering from surgery)    Negative: Fever present > 3 days (72 hours)    Negative: Fever returns after gone for over 24 hours and symptoms worse or not improved    Negative: Earache    Negative: Sinus pain (not just congestion) and fever    Negative: Sinus congestion (pressure, fullness) present > 10 days    Negative: Nasal discharge present > 10 days    Negative: Lots of coughing    Protocols used: SINUS PAIN OR CONGESTION-A-OH

## 2023-01-31 ENCOUNTER — VIRTUAL VISIT (OUTPATIENT)
Dept: FAMILY MEDICINE | Facility: CLINIC | Age: 78
End: 2023-01-31
Payer: MEDICARE

## 2023-01-31 DIAGNOSIS — J01.90 ACUTE SINUSITIS WITH SYMPTOMS > 10 DAYS: Primary | ICD-10-CM

## 2023-01-31 PROCEDURE — 99213 OFFICE O/P EST LOW 20 MIN: CPT | Mod: 95 | Performed by: INTERNAL MEDICINE

## 2023-01-31 NOTE — PATIENT INSTRUCTIONS
Sinusitis (Antibiotic Treatment)    The sinuses are air-filled spaces within the bones of the face. They connect to the inside of the nose. Sinusitis is an inflammation of the tissue that lines the sinuses. Sinusitis can occur during a cold. It can also happen due to allergies to pollens and other particles in the air. Sinusitis can cause symptoms of sinus congestion and a feeling of fullness. A sinus infection causes fever, headache, and facial pain. There is often green or yellow fluid draining from the nose or into the back of the throat (post-nasal drip). You have been given antibiotics to treat this condition.   Home care    Take the full course of antibiotics as instructed. Don't stop taking them, even when you feel better.    Drink plenty of water, hot tea, and other liquids as directed by the healthcare provider. This may help thin nasal mucus. It also may help your sinuses drain fluids.    Heat may help soothe painful areas of your face. Use a towel soaked in hot water. Or,  the shower and direct the warm spray onto your face. Using a vaporizer along with a menthol rub at night may also help soothe symptoms.     An expectorant with guaifenesin may help thin nasal mucus and help your sinuses drain fluids. Talk with your provider or pharmacists before taking an over-the-counter (OTC) medicine if you have any questions about it or its side effects..    You can use an OTC decongestant, unless a similar medicine was prescribed to you. Nasal sprays work the fastest. Use one that contains phenylephrine or oxymetazoline. First blow your nose gently. Then use the spray. Don't use these medicines more often than directed on the label. If you do, your symptoms may get worse. You may also take pills that contain pseudoephedrine. Don t use products that combine multiple medicines. This is because side effects may be increased. Read labels. You can also ask the pharmacist for help. (People with high blood  pressure should not use decongestants. They can raise blood pressure.) Talk with your provider or pharmacist if you have any questions about the medicine..    OTC antihistamines may help if allergies contributed to your sinusitis. Talk with your provider or pharmacist if you have any questions about the medicine..    Don't use nasal rinses or irrigation during an acute sinus infection, unless your healthcare provider tells you to. Rinsing may spread the infection to other areas in your sinuses.    Use acetaminophen or ibuprofen to control pain, unless another pain medicine was prescribed to you. If you have chronic liver or kidney disease or ever had a stomach ulcer, talk with your healthcare provider before using these medicines. Never give aspirin to anyone under age 18 who is ill with a fever. It may cause severe liver damage.    Don't smoke. This can make symptoms worse.    Follow-up care  Follow up with your healthcare provider, or as advised.   When to seek medical advice  Call your healthcare provider if any of these occur:     Facial pain or headache that gets worse    Stiff neck    Unusual drowsiness or confusion    Swelling of your forehead or eyelids    Symptoms don't go away in 10 days    Vision problems, such as blurred or double vision    Fever of 100.4 F (38 C) or higher, or as directed by your healthcare provider  Call 911  Call 911 if any of these occur:     Seizure    Trouble breathing    Feeling dizzy or faint    Fingernails, skin or lips look blue, purple , or gray  Prevention  Here are steps you can take to help prevent an infection:     Keep good hand washing habits.    Don t have close contact with people who have sore throats, colds, or other upper respiratory infections.    Don t smoke, and stay away from secondhand smoke.    Stay up to date with of your vaccines.  [employer] last reviewed this educational content on 12/1/2019 2000-2021 [employer]. All rights reserved. This  information is not intended as a substitute for professional medical care. Always follow your healthcare professional's instructions.

## 2023-01-31 NOTE — PROGRESS NOTES
Chayo is a 77 year old who is being evaluated via a billable video visit.      How would you like to obtain your AVS? MyChart  If the video visit is dropped, the invitation should be resent by: Text to cell phone: [phone]  Will anyone else be joining your video visit? No              Subjective   Chayo is a 77 year old, presenting for the following health issues:  No chief complaint on file.    Her illness has been about 12.  He is actually a bit better today.  She has nasal congestion with some facial pressure although that is better.  No fevers.  She had a sore throat but that is gone and she does not have an earache although some pressure at times.  No cough.  No other symptoms.      Vitals:  No vitals were obtained today due to virtual visit.    Physical Exam   GENERAL: Healthy, alert and no distress  EYES: Eyes grossly normal to inspection.  No discharge or erythema, or obvious scleral/conjunctival abnormalities.  RESP: No audible wheeze, cough, or visible cyanosis.  No visible retractions or increased work of breathing.    SKIN: Visible skin clear. No significant rash, abnormal pigmentation or lesions.  NEURO: Cranial nerves grossly intact.  Mentation and speech appropriate for age.  PSYCH: Mentation appears normal, affect normal/bright, judgement and insight intact, normal speech and appearance well-groomed.      ASSESSMENT:  Sinusitis, somewhat better today but certainly not gone and she has ongoing congestion and green discharge.  What I have done is sent in the Augmentin in case but I have instructed her to wait a day.  If she worsens or is not resolving in the next couple days she should start this.    Marco Jones M.D.            Video-Visit Details    Type of service:  Video Visit   Video Start Time: 1:24  Video End Time:1:32 PM    Originating Location (pt. Location): Home  Distant Location (provider location):  On-site  Platform used for Video Visit: [facility]

## 2023-04-14 ENCOUNTER — TELEPHONE (OUTPATIENT)
Dept: FAMILY MEDICINE | Facility: CLINIC | Age: 78
End: 2023-04-14

## 2023-04-14 DIAGNOSIS — M54.9 BACK PAIN: Primary | ICD-10-CM

## 2023-04-14 DIAGNOSIS — M48.00 SPINAL STENOSIS, UNSPECIFIED SPINAL REGION: ICD-10-CM

## 2023-04-14 NOTE — TELEPHONE ENCOUNTER
Patient calling clinic to request 6 PT visits with Vladimir Walters with [facility] to help work on her back. Patient stated provider has referred her in the past for PT. Patient does not report new or worsening symptoms.     Routing to provider for review.    [facility] Fax Number: [phone]

## 2023-05-04 ENCOUNTER — TRANSFERRED RECORDS (OUTPATIENT)
Dept: HEALTH INFORMATION MANAGEMENT | Facility: CLINIC | Age: 78
End: 2023-05-04
Payer: MEDICARE

## 2023-05-15 ENCOUNTER — VIRTUAL VISIT (OUTPATIENT)
Dept: FAMILY MEDICINE | Facility: CLINIC | Age: 78
End: 2023-05-15
Payer: MEDICARE

## 2023-05-15 DIAGNOSIS — R07.89 CHEST HEAVINESS: Primary | ICD-10-CM

## 2023-05-15 PROCEDURE — 99213 OFFICE O/P EST LOW 20 MIN: CPT | Mod: VID | Performed by: INTERNAL MEDICINE

## 2023-05-15 NOTE — PROGRESS NOTES
Chayo is a 78 year old who is being evaluated via a billable video visit.      How would you like to obtain your AVS? MyChart  If the video visit is dropped, the invitation should be resent by: Text to cell phone: [phone]  Will anyone else be joining your video visit? No              Subjective   Chayo is a 78 year old, presenting for the following health issues:  Eye Problem (Redness around left eye) and Dizziness (Low BP)          Objective    Vitals - Patient Reported  Systolic (Patient Reported): 122  Diastolic (Patient Reported): 78      Vitals:  No vitals were obtained today due to virtual visit.    Physical Exam   GENERAL: Healthy, alert and no distress  EYES: Eyes grossly normal to inspection.  No discharge or erythema, or obvious scleral/conjunctival abnormalities.  RESP: No audible wheeze, cough, or visible cyanosis.  No visible retractions or increased work of breathing.    SKIN: Visible skin clear. No significant rash, abnormal pigmentation or lesions.  NEURO: Cranial nerves grossly intact.  Mentation and speech appropriate for age.  PSYCH: Mentation appears normal, affect normal/bright, judgement and insight intact, normal speech and appearance well-groomed.    Two episodes when out for walk where she had chest heaviness.  Sat down and went away, both on walks and not at other times.  Second was 5/11/23 and first was 5/9/23.  When she got home her blood pressure was 88.  Later that night took a bath and when got out felt l.h.   She was not having shortness of breath.  No other c/o, no fever, no coughs, colds, no gi c/o.  Feels fine now.    5 days ago noticed red spot on left eye lid lateral side.  Noticed it again a bit more medially and lower leg few days later.  No trauma.  This orning was red more medially.  The eye itself is fine, no eye pain or redness or vision changes.  No ha, no fever.  No facial swelling.    ASSESSMENT:  1. Chest heaviness, a bit worrisome for cv issue, doubt pulmonary  embolis, doubt sepsis, doubt gi bleed.  She is doing fine now.      2. Eye redness, hard to see much on video, looks normal to me.  For now use warmth and if worsens or not gone soon to let me know.    PLAN:  Above, to get est echo. Told not to exercise until does that, if more discomfort let me know.    Marco Jones M.D.              Video-Visit Details    Type of service:  Video Visit   Video Start Time: 12:14 PM  Video End Time:12:27 PM    Originating Location (pt. Location): Home    Distant Location (provider location):  On-site  Platform used for Video Visit: Samuel

## 2023-05-18 ENCOUNTER — TELEPHONE (OUTPATIENT)
Dept: FAMILY MEDICINE | Facility: CLINIC | Age: 78
End: 2023-05-18

## 2023-05-18 NOTE — TELEPHONE ENCOUNTER
[facility] Call Center    Phone Message    May a detailed message be left on voicemail: yes     Reason for Call: Other: Pt hernandez din to get appointment scheduled before she leaves town .Please followup with Pt     Action Taken: Other: cardio    Travel Screening: Not Applicable     Thank you!  [facility]

## 2023-05-23 ENCOUNTER — TRANSFERRED RECORDS (OUTPATIENT)
Dept: HEALTH INFORMATION MANAGEMENT | Facility: CLINIC | Age: 78
End: 2023-05-23

## 2023-05-23 ENCOUNTER — HOSPITAL ENCOUNTER (OUTPATIENT)
Dept: CARDIOLOGY | Facility: CLINIC | Age: 78
Discharge: HOME | End: 2023-05-23
Attending: INTERNAL MEDICINE | Admitting: INTERNAL MEDICINE
Payer: MEDICARE

## 2023-05-23 DIAGNOSIS — R07.89 CHEST HEAVINESS: ICD-10-CM

## 2023-05-23 PROCEDURE — 93321 DOPPLER ECHO F-UP/LMTD STD: CPT | Mod: TC

## 2023-05-23 PROCEDURE — 93016 CV STRESS TEST SUPVJ ONLY: CPT | Performed by: INTERNAL MEDICINE

## 2023-05-23 PROCEDURE — 93018 CV STRESS TEST I&R ONLY: CPT | Performed by: INTERNAL MEDICINE

## 2023-05-23 PROCEDURE — 93321 DOPPLER ECHO F-UP/LMTD STD: CPT | Mod: 26 | Performed by: INTERNAL MEDICINE

## 2023-05-23 PROCEDURE — 93325 DOPPLER ECHO COLOR FLOW MAPG: CPT | Mod: 26 | Performed by: INTERNAL MEDICINE

## 2023-05-23 PROCEDURE — 93350 STRESS TTE ONLY: CPT | Mod: 26 | Performed by: INTERNAL MEDICINE

## 2023-05-23 NOTE — RESULT ENCOUNTER NOTE
Hello -    I'm covering for Dr. Jones today:    I'm happy to report that your recent stress test was normal.    Dr. Jones will see the results when he returns to the clinic.     Please let us know if you have any questions or concerns.    Regards,  Nicole Brennan PA-C

## 2023-08-31 ENCOUNTER — TRANSFERRED RECORDS (OUTPATIENT)
Dept: HEALTH INFORMATION MANAGEMENT | Facility: CLINIC | Age: 78
End: 2023-08-31
Payer: MEDICARE

## 2023-11-13 ENCOUNTER — TRANSFERRED RECORDS (OUTPATIENT)
Dept: HEALTH INFORMATION MANAGEMENT | Facility: CLINIC | Age: 78
End: 2023-11-13
Payer: MEDICARE

## 2024-01-07 ENCOUNTER — HEALTH MAINTENANCE LETTER (OUTPATIENT)
Age: 79
End: 2024-01-07

## 2024-01-08 ENCOUNTER — TELEPHONE (OUTPATIENT)
Dept: FAMILY MEDICINE | Facility: CLINIC | Age: 79
End: 2024-01-08
Payer: MEDICARE

## 2024-01-08 NOTE — TELEPHONE ENCOUNTER
Pt is traveling and wants advice on melatonin dose she can take to help her sleep during travel. Pt denies any sleep problems  or acute symptoms now.

## 2024-01-10 NOTE — TELEPHONE ENCOUNTER
Writer called and spoke with patient and reviewed covering provider's recommendation, patient expressed verbal understanding and is agreeable.    No further questions or concerns at this time.    Signing encounter.    Lester Powell RN  [facility]

## 2024-02-02 ENCOUNTER — OFFICE VISIT (OUTPATIENT)
Dept: FAMILY MEDICINE | Facility: CLINIC | Age: 79
End: 2024-02-02
Payer: MEDICARE

## 2024-02-02 VITALS
RESPIRATION RATE: 16 BRPM | SYSTOLIC BLOOD PRESSURE: 166 MMHG | TEMPERATURE: 97.7 F | OXYGEN SATURATION: 98 % | HEIGHT: 65 IN | BODY MASS INDEX: 25.16 KG/M2 | HEART RATE: 53 BPM | WEIGHT: 151 LBS | DIASTOLIC BLOOD PRESSURE: 82 MMHG

## 2024-02-02 DIAGNOSIS — M54.50 CHRONIC LOW BACK PAIN, UNSPECIFIED BACK PAIN LATERALITY, UNSPECIFIED WHETHER SCIATICA PRESENT: Primary | ICD-10-CM

## 2024-02-02 DIAGNOSIS — G89.29 CHRONIC LOW BACK PAIN, UNSPECIFIED BACK PAIN LATERALITY, UNSPECIFIED WHETHER SCIATICA PRESENT: Primary | ICD-10-CM

## 2024-02-02 PROCEDURE — 99213 OFFICE O/P EST LOW 20 MIN: CPT | Performed by: INTERNAL MEDICINE

## 2024-02-02 RX ORDER — GABAPENTIN 300 MG/1
600 CAPSULE ORAL 3 TIMES DAILY
Qty: 270 CAPSULE | Refills: 3 | Status: SHIPPED | OUTPATIENT
Start: 2024-02-02 | End: 2024-07-21

## 2024-02-02 ASSESSMENT — PAIN SCALES - GENERAL: PAINLEVEL: NO PAIN (0)

## 2024-02-02 NOTE — PROGRESS NOTES
The patient presents with her  for low back pain. As noted and reviewed she has had back pain for over 20 years which is worsened over the last year.  On January 2 she was seen at the [facility] by neurosurgery.  She was seen by physical medicine as well.  She was felt to be a good candidate for an L4-5 TLIF and is considering that.  Her pain significantly affects the quality of her life to the point where she would consider surgery.  She takes gabapentin but at fairly low dose for this.  The physical medicine physician noted the possibility of facet agenic back pain with severe degenerative central lumbar stenosis with spondylolisthesis and a normal neurologic exam.  They discussed trying facet joint injections and she is considering that.    I discussed this with the patient and her .  I told her it was my opinion that she try conservative measures first but if that fails then I certainly would consider surgery if this is significantly affecting her life and she understands this.  Certainly there are risks of surgery including infection, stroke, heart attack and blood clots amongst other things.    I did suggest she try gradually increasing the dose of her gabapentin to see if this would make a difference and she agreed to do this.    She will follow up with me for her complete physical exam.    ASSESSMENT:  Low back pain    PLAN:  Above    Marco Jones M.D.  22 minutes on the day of the encounter doing chart review, history and exam, documentation and further activities as noted above.

## 2024-02-02 NOTE — PATIENT INSTRUCTIONS
Change the dose of the gabapentin to 300mg am, 300mg noon, 600mg at night.  Give this a week and if it is not better then go to 600mg am, 300mg noon and 600mg pm.  If this is not doing it over the next week then try 600mg three times daily.  If it makes you unsteady of too drowsy drop back to the 300mg dosing three times daily.      Marco Jones M.D.

## 2024-05-02 ENCOUNTER — TELEPHONE (OUTPATIENT)
Dept: SCHEDULING | Facility: CLINIC | Age: 79
End: 2024-05-02
Payer: MEDICARE

## 2024-05-02 DIAGNOSIS — M48.061 SPINAL STENOSIS OF LUMBAR REGION WITHOUT NEUROGENIC CLAUDICATION: Primary | ICD-10-CM

## 2024-05-02 NOTE — TELEPHONE ENCOUNTER
Order/Referral Request    Who is requesting: Patient    Orders being requested: Neurosurgery    Reason service is needed/diagnosis: Ongoing back pain/issues    When are orders needed by: ASAP    Has this been discussed with Provider: Yes    Does patient have a preference on a Group/Provider/Facility? [facility] Dr. Van Box    Does patient have an appointment scheduled?: No    Where to send orders: Place orders within Epic    Could we send this information to you in [facility] or would you prefer to receive a phone call?:   Patient would prefer a phone call   Okay to leave a detailed message?: Yes at Cell number on file:    Telephone Information:   Mobile [phone]

## 2024-05-02 NOTE — TELEPHONE ENCOUNTER
Saw Dr. Jones for this on 2/2, has seen Pain/Palliative since.   Neurosurgery referral request.  Not sure another visit would be needed

## 2024-05-03 NOTE — TELEPHONE ENCOUNTER
Patient Contact    Attempt # 1    Was call answered? No.    Left message for patient to call triage back.    Ashley Guo RN

## 2024-05-03 NOTE — TELEPHONE ENCOUNTER
VI  Pager: [phone]  [facility]     Please let pt know:   I placed order for neurosurg  I also placed an order for physical therapy with [facility], Jorge A Mendieta. He truly is incredible with back pain. It's not a typical form of PT but is more hands on. Many of my patients have had significant improvement in pain from seeing him. I strongly urge her to at least schedule 1 visit. She won't regret it. Please also fax referral. His colleague Stephanie is also wonderful if she can see her instead.  [facility]   Jorge A Mendieta, PT  Located in: [facility]  Address: [address], [place] [address]  Phone: [phone]  Fax: [phone]      CAMILA Mabry CNP

## 2024-05-03 NOTE — TELEPHONE ENCOUNTER
Patient given message from Desiree JENSEN CNP. Patient states that she does have a physical therapist that she really likes, but would like recommendation for PT sent to her through [employer].    Patient grateful for referral to neurosurg.    Anay Song RN

## 2024-05-13 ENCOUNTER — PRE VISIT (OUTPATIENT)
Dept: NEUROSURGERY | Facility: CLINIC | Age: 79
End: 2024-05-13
Payer: MEDICARE

## 2024-05-13 NOTE — TELEPHONE ENCOUNTER
Action    Action Taken Request sent to [facility] for imaging      SPINE PATIENTS - NEW PROTOCOL PREVISIT    RECORDS RECEIVED FROM: Referred by Marco Jones MD   REASON FOR VISIT: Spinal stenosis of lumbar region without neurogenic claudication, per patient records in epic    PROVIDER: [facility]   DATE OF APPT: 06/04/2024   NOTES (FOR ALL VISITS) STATUS DETAILS   OFFICE NOTE from referring provider Internal Referral and notes in chart   OFFICE NOTE from other specialist Internal Prev seen Ken Morales M.D., Ph.D. [facility]    DX 12/07/2023  DX entire spine 12/07/2023    Injection- 06/05/2023  [facility]   DISCHARGE SUMMARY from hospital N/A    DISCHARGE REPORT from ER N/A    OPERATIVE REPORT N/A    EMG REPORT N/A    MEDICATION LIST N/A    IMAGING  (FOR ALL VISITS)     MRI (HEAD, NECK, SPINE) In PACs MR Lumbar 05/23/2023    XRAY (SPINE) *NEUROSURGERY* N/A    CT (HEAD, NECK, SPINE) In chart CT Lumbar 12/08/2023 /[facility]

## 2024-05-19 ENCOUNTER — PATIENT OUTREACH (OUTPATIENT)
Dept: CARE COORDINATION | Facility: CLINIC | Age: 79
End: 2024-05-19
Payer: MEDICARE

## 2024-05-24 ENCOUNTER — TELEPHONE (OUTPATIENT)
Dept: NEUROSURGERY | Facility: CLINIC | Age: 79
End: 2024-05-24
Payer: MEDICARE

## 2024-05-24 NOTE — TELEPHONE ENCOUNTER
Called patient to triage symptoms. She's seeking a 2nd opinion with Dr. Riggs. She said her symptoms are Stable/unchanged as when she was seen by [facility] Dr. Ken Morales 1/2/2024 and PCP visit 2/2/24. Still having low back pain, BLE pain > in Left, pain in buttocks and post thighs. Per Dr. Riggs message below, if symptoms stable or unchanged she does not need new MRI.     Reviewed appt details with patient.     ----- Message -----  From: Van Riggs MD  Sent: 5/24/2024   7:44 AM CDT  To: Sarahi Khan RN; Randolph Lugo  Subject: RE: regarding appt                               If her symptoms are stable, then I would not need a new MRI. If her symptoms have changed, the I am happy to order new MRI.    Koffi  ----- Message -----  From: Sarahi Khan RN  Sent: 5/23/2024   1:21 PM CDT  To: Van Riggs MD; Randolph Lugo  Subject: FW: regarding appt                               Hi Dr. Riggs,    See below from Randolph. Appt is 6/4  Last lumbar MRI 5/23/2023, lumbar CT 12/8/23, Dexa 12/7/23, Scoli XR 12/7/23.   Do you want New MRI to be ordered by PCP and completed prior to visit with you?  ----- Message -----  From: Randolph Lugo  Sent: 5/23/2024  11:58 AM CDT  To: [facility]  Subject: regarding appt                                   Patient referred to clinic, has completed some workup such as MBB, injections, bone density test. Also has recent CT and XR in chart. Patients MRI is a little over a year old (in PACs) should patient get a new MRI prior to appt? If so are we able to provide order or should patient go back to referring MD

## 2024-06-04 ENCOUNTER — OFFICE VISIT (OUTPATIENT)
Dept: NEUROSURGERY | Facility: CLINIC | Age: 79
End: 2024-06-04
Attending: INTERNAL MEDICINE
Payer: MEDICARE

## 2024-06-04 VITALS — HEART RATE: 56 BPM | OXYGEN SATURATION: 97 % | DIASTOLIC BLOOD PRESSURE: 84 MMHG | SYSTOLIC BLOOD PRESSURE: 146 MMHG

## 2024-06-04 DIAGNOSIS — M43.10 DEGENERATIVE SPONDYLOLISTHESIS: Primary | ICD-10-CM

## 2024-06-04 DIAGNOSIS — M48.062 SPINAL STENOSIS OF LUMBAR REGION WITH NEUROGENIC CLAUDICATION: ICD-10-CM

## 2024-06-04 PROCEDURE — 99204 OFFICE O/P NEW MOD 45 MIN: CPT | Performed by: NEUROLOGICAL SURGERY

## 2024-06-04 PROCEDURE — G2211 COMPLEX E/M VISIT ADD ON: HCPCS | Performed by: NEUROLOGICAL SURGERY

## 2024-06-04 PROCEDURE — G0463 HOSPITAL OUTPT CLINIC VISIT: HCPCS | Performed by: NEUROLOGICAL SURGERY

## 2024-06-04 RX ORDER — CHOLECALCIFEROL (VITAMIN D3) 50 MCG
1 TABLET ORAL 2 TIMES DAILY
COMMUNITY

## 2024-06-04 RX ORDER — SODIUM PHOSPHATE,MONO-DIBASIC 19G-7G/118
500 ENEMA (ML) RECTAL DAILY
COMMUNITY

## 2024-06-04 ASSESSMENT — PAIN SCALES - GENERAL: PAINLEVEL: NO PAIN (1)

## 2024-06-04 NOTE — NURSING NOTE
"Rebecca Prieto is a 79 year old female who presents for:  Chief Complaint   Patient presents with    Consult        Vitals:    Vitals:    06/04/24 1345   BP: (!) 146/84   Pulse: 56   SpO2: 97%       BMI:  Estimated body mass index is 24.86 kg/m  as calculated from the following:    Height as of 2/2/24: 5' 5.35\" (1.66 m).    Weight as of 2/2/24: 151 lb (68.5 kg).    Pain Score:  No Pain (1)        Amendo Phorn      "

## 2024-06-04 NOTE — LETTER
6/4/2024      Rebecca Prieto  [address] [place] [address]      Dear Colleague,    Thank you for referring your patient, Rebecca Prieto, to the [facility] [place]. Please see a copy of my visit note below.    It was a pleasure to see Rebecca Prieto today in Neurosurgery Clinic. She is a 79 year old female who is here for evaluation for her lumbar spondylolisthesis, stenosis, neurogenic claudication.  She describes a long history of back pain but this is worsened over the last year or so.  She has pain in the back which radiates down both legs to the back of the legs with some burning sensation that is worse with activity.  Tends to be slightly worse on the left than the right.  She has previously done an epidural steroid injection with brief mild relief.  She was seen at the [facility] and also underwent medial branch blocks in March 2024 which did not provide relief either.  She is also taken ibuprofen and gabapentin with modest relief.  She has done physical therapy extensively along with acupuncture again with modest relief at best.    She does describe problems with postoperative nausea and vomiting with anesthesia as well as difficulty tolerating pain medications postoperatively.    Past Medical History:   Diagnosis Date     Adenomatous polyp of colon 2002    fu done 2011 and to fu 2014, fu done 2014 and to fu 2019, fu done 2020 and to fu 5 years     Chest discomfort 05/2023    neg est echo     Hypercholesteraemia      Migraines      Osteopenia 2005    fu 2009 better, -0.4 spine and -1.1 fem neck, has fu with gyn; had fu dexa with gyn 2020; added fosamax 2/16/21     PONV (postoperative nausea and vomiting)      Screening 2005    abd us neg for aaa due to fh     Spinal stenosis     on neurontin, mri done 6/19 at [facility], seen by nsurgery x 2     Spondylolistheses      Tonsillar abscess 1992     Vitamin D deficiency 2014     Past Surgical History:   Procedure  Laterality Date     breast biopsies      twice     HYSTERECTOMY, PAP NO LONGER INDICATED  1998    due to hyperplasia     right knee arthroscopy  1998     right knee replacement  2016    [place]     ROTATOR CUFF REPAIR RT/LT  2007     STRIP VEIN  2010        Allergies   Allergen Reactions     Succinylcholine      Gets paralyzed     Nickel Rash       Current Outpatient Medications:      acetaminophen (TYLENOL) 500 MG tablet, Take 500-1,000 mg by mouth every 8 hours as needed for mild pain, Disp: , Rfl:      Calcium Carbonate-Vitamin D (CALCIUM + D PO), Take 1 capsule by mouth daily , Disp: , Rfl:      conjugated estrogens (PREMARIN) vaginal cream, Place 1 g vaginally twice a week, Disp: , Rfl:      gabapentin (NEURONTIN) 300 MG capsule, Take 2 capsules (600 mg) by mouth 3 times daily, Disp: 270 capsule, Rfl: 3     glucosamine 500 MG CAPS capsule, Take 500 mg by mouth daily, Disp: , Rfl:      ibuprofen (ADVIL/MOTRIN) 200 MG capsule, Take 200 mg by mouth every 8 hours as needed for fever, Disp: , Rfl:      vitamin D3 (CHOLECALCIFEROL) 50 mcg (2000 units) tablet, Take 1 tablet by mouth 2 times daily, Disp: , Rfl:      SUMAtriptan (IMITREX) 50 MG tablet, TAKE 1 TABLET AS NEEDED FOR MIGRAINE (DUE FOR A PHYSICAL IN MAY) Strength: 50 mg, Disp: 18 tablet, Rfl: 11  Social History     Socioeconomic History     Marital status:      Spouse name: None     Number of children: 3     Years of education: None     Highest education level: None   Tobacco Use     Smoking status: Never     Smokeless tobacco: Never   Substance and Sexual Activity     Alcohol use: Yes     Alcohol/week: 7.0 standard drinks of alcohol     Types: 7 Standard drinks or equivalent per week     Drug use: No     Sexual activity: Yes     Partners: Male     Social Determinants of Health     Financial Resource Strain: Low Risk  (2/2/2024)    Financial Resource Strain      Within the past 12 months, have you or your family members you live with been unable to get  "utilities (heat, electricity) when it was really needed?: No   Food Insecurity: Low Risk  (2/2/2024)    Food Insecurity      Within the past 12 months, did you worry that your food would run out before you got money to buy more?: No      Within the past 12 months, did the food you bought just not last and you didn t have money to get more?: No   Transportation Needs: Low Risk  (2/2/2024)    Transportation Needs      Within the past 12 months, has lack of transportation kept you from medical appointments, getting your medicines, non-medical meetings or appointments, work, or from getting things that you need?: No   Physical Activity: Sufficiently Active (12/30/2023)    [facility]    Exercise Vital Sign      Days of Exercise per Week: 5 days      Minutes of Exercise per Session: 30 min    Received from [facility]    Social Connections   Housing Stability: Low Risk  (2/2/2024)    Housing Stability      Do you have housing? : Yes      Are you worried about losing your housing?: No      Problem (# of Occurrences) Relation (Name,Age of Onset)    Prostate Cancer (1) Brother             ROS: 10 point ROS neg other than the symptoms noted above in the HPI.    Vitals:    06/04/24 1345   BP: (!) 146/84   Pulse: 56   SpO2: 97%     Estimated body mass index is 24.86 kg/m  as calculated from the following:    Height as of 2/2/24: 1.66 m (5' 5.35\").    Weight as of 2/2/24: 68.5 kg (151 lb).  No Pain (1)    Oswestry (FAYE) Questionnaire        6/4/2024     1:00 PM   OSWESTRY DISABILITY INDEX   Count 9   Sum 18   Oswestry Score (%) 40 %       Visual Analog Scale (VAS) Questionnaire         No data to display                   Awake alert and oriented  Bilateral lower extremity strength is 5 out of 5 in all muscle groups  Reflexes 0 bilateral patella and Achilles  Sensation intact to light touch.  Negative straight leg raise  Negative GLORIA sign  Palpable step-off in the " lumbar spine.    Imaging: Review of her lumbar x-rays, MRI show a grade 1/2 spondylolisthesis at L4-5.  Her overall sagittal and coronal balance appears to be quite reasonable.  There is severe stenosis at L4-5 from a combination of the spondylolisthesis and facet hypertrophy.  Imaging was reviewed with the patient shown to the patient clinic today.    She has a DEXA scan that also shows some osteopenia without osteoporosis.    Assessment: Lumbar spondylolisthesis and stenosis with neurogenic claudication.    Plan: We discussed surgical treatment with a minimally invasive lumbar fusion. The risks benefits and alternatives to the procedure were discussed. Risks include, but are not limited to, bleeding, infection, neurologic injury such as nerve root injury, CSF leak, need for further surgery or revision of any implanted hardware, failure for symptoms to improve. Questions were solicited and answered, and the patient wishes to review her options and will let us know if and when she wishes to proceed with surgery.         Again, thank you for allowing me to participate in the care of your patient.        Sincerely,        Van Riggs MD

## 2024-06-04 NOTE — PROGRESS NOTES
It was a pleasure to see Rebecca Prieto today in Neurosurgery Clinic. She is a 79 year old female who is here for evaluation for her lumbar spondylolisthesis, stenosis, neurogenic claudication.  She describes a long history of back pain but this is worsened over the last year or so.  She has pain in the back which radiates down both legs to the back of the legs with some burning sensation that is worse with activity.  Tends to be slightly worse on the left than the right.  She has previously done an epidural steroid injection with brief mild relief.  She was seen at the [facility] and also underwent medial branch blocks in March 2024 which did not provide relief either.  She is also taken ibuprofen and gabapentin with modest relief.  She has done physical therapy extensively along with acupuncture again with modest relief at best.    She does describe problems with postoperative nausea and vomiting with anesthesia as well as difficulty tolerating pain medications postoperatively.    Past Medical History:   Diagnosis Date    Adenomatous polyp of colon 2002    fu done 2011 and to fu 2014, fu done 2014 and to fu 2019, fu done 2020 and to fu 5 years    Chest discomfort 05/2023    neg est echo    Hypercholesteraemia     Migraines     Osteopenia 2005    fu 2009 better, -0.4 spine and -1.1 fem neck, has fu with gyn; had fu dexa with gyn 2020; added fosamax 2/16/21    PONV (postoperative nausea and vomiting)     Screening 2005    abd us neg for aaa due to fh    Spinal stenosis     on neurontin, mri done 6/19 at [facility], seen by nsurgery x 2    Spondylolistheses     Tonsillar abscess 1992    Vitamin D deficiency 2014     Past Surgical History:   Procedure Laterality Date    breast biopsies      twice    HYSTERECTOMY, PAP NO LONGER INDICATED  1998    due to hyperplasia    right knee arthroscopy  1998    [place]  2016    [place]    ROTATOR CUFF REPAIR RT/LT  2007    STRIP VEIN  2010        Allergies   Allergen  Reactions    Succinylcholine      Gets paralyzed    Nickel Rash       Current Outpatient Medications:     acetaminophen (TYLENOL) 500 MG tablet, Take 500-1,000 mg by mouth every 8 hours as needed for mild pain, Disp: , Rfl:     Calcium Carbonate-Vitamin D (CALCIUM + D PO), Take 1 capsule by mouth daily , Disp: , Rfl:     conjugated estrogens (PREMARIN) vaginal cream, Place 1 g vaginally twice a week, Disp: , Rfl:     gabapentin (NEURONTIN) 300 MG capsule, Take 2 capsules (600 mg) by mouth 3 times daily, Disp: 270 capsule, Rfl: 3    glucosamine 500 MG CAPS capsule, Take 500 mg by mouth daily, Disp: , Rfl:     ibuprofen (ADVIL/MOTRIN) 200 MG capsule, Take 200 mg by mouth every 8 hours as needed for fever, Disp: , Rfl:     vitamin D3 (CHOLECALCIFEROL) 50 mcg (2000 units) tablet, Take 1 tablet by mouth 2 times daily, Disp: , Rfl:     SUMAtriptan (IMITREX) 50 MG tablet, TAKE 1 TABLET AS NEEDED FOR MIGRAINE (DUE FOR A PHYSICAL IN MAY) Strength: 50 mg, Disp: 18 tablet, Rfl: 11  Social History     Socioeconomic History    Marital status:      Spouse name: None    Number of children: 3    Years of education: None    Highest education level: None   Tobacco Use    Smoking status: Never    Smokeless tobacco: Never   Substance and Sexual Activity    Alcohol use: Yes     Alcohol/week: 7.0 standard drinks of alcohol     Types: 7 Standard drinks or equivalent per week    Drug use: No    Sexual activity: Yes     Partners: Male     Social Determinants of Health     Financial Resource Strain: Low Risk  (2/2/2024)    Financial Resource Strain     Within the past 12 months, have you or your family members you live with been unable to get utilities (heat, electricity) when it was really needed?: No   Food Insecurity: Low Risk  (2/2/2024)    Food Insecurity     Within the past 12 months, did you worry that your food would run out before you got money to buy more?: No     Within the past 12 months, did the food you bought just not  "last and you didn t have money to get more?: No   Transportation Needs: Low Risk  (2/2/2024)    Transportation Needs     Within the past 12 months, has lack of transportation kept you from medical appointments, getting your medicines, non-medical meetings or appointments, work, or from getting things that you need?: No   Physical Activity: Sufficiently Active (12/30/2023)    [facility] from [facility], [facility]    Exercise Vital Sign     Days of Exercise per Week: 5 days     Minutes of Exercise per Session: 30 min    Received from [employer] [facility]    Social [place]   Housing Stability: Low Risk  (2/2/2024)    Housing Stability     Do you have housing? : Yes     Are you worried about losing your housing?: No      Problem (# of Occurrences) Relation (Name,Age of Onset)    Prostate Cancer (1) Brother             ROS: 10 point ROS neg other than the symptoms noted above in the HPI.    Vitals:    06/04/24 1345   BP: (!) 146/84   Pulse: 56   SpO2: 97%     Estimated body mass index is 24.86 kg/m  as calculated from the following:    Height as of 2/2/24: 1.66 m (5' 5.35\").    Weight as of 2/2/24: 68.5 kg (151 lb).  No Pain (1)    Oswestry (FAYE) Questionnaire        6/4/2024     1:00 PM   OSWESTRY DISABILITY INDEX   Count 9   Sum 18   Oswestry Score (%) 40 %       Visual Analog Scale (VAS) Questionnaire         No data to display                   Awake alert and oriented  Bilateral lower extremity strength is 5 out of 5 in all muscle groups  Reflexes 0 bilateral patella and Achilles  Sensation intact to light touch.  Negative straight leg raise  Negative GLORIA sign  Palpable step-off in the lumbar spine.    Imaging: Review of her lumbar x-rays, MRI show a grade 1/2 spondylolisthesis at L4-5.  Her overall sagittal and coronal balance appears to be quite reasonable.  There is severe stenosis at L4-5 from a combination of the spondylolisthesis and facet hypertrophy.  Imaging was reviewed with " the patient shown to the patient clinic today.    She has a DEXA scan that also shows some osteopenia without osteoporosis.    Assessment: Lumbar spondylolisthesis and stenosis with neurogenic claudication.    Plan: We discussed surgical treatment with a minimally invasive lumbar fusion. The risks benefits and alternatives to the procedure were discussed. Risks include, but are not limited to, bleeding, infection, neurologic injury such as nerve root injury, CSF leak, need for further surgery or revision of any implanted hardware, failure for symptoms to improve. Questions were solicited and answered, and the patient wishes to review her options and will let us know if and when she wishes to proceed with surgery.

## 2024-07-21 DIAGNOSIS — G89.29 CHRONIC LOW BACK PAIN, UNSPECIFIED BACK PAIN LATERALITY, UNSPECIFIED WHETHER SCIATICA PRESENT: ICD-10-CM

## 2024-07-21 DIAGNOSIS — M54.50 CHRONIC LOW BACK PAIN, UNSPECIFIED BACK PAIN LATERALITY, UNSPECIFIED WHETHER SCIATICA PRESENT: ICD-10-CM

## 2024-07-21 RX ORDER — GABAPENTIN 300 MG/1
CAPSULE ORAL
Qty: 270 CAPSULE | Refills: 7 | Status: SHIPPED | OUTPATIENT
Start: 2024-07-21

## 2024-07-29 ENCOUNTER — OFFICE VISIT (OUTPATIENT)
Dept: FAMILY MEDICINE | Facility: CLINIC | Age: 79
End: 2024-07-29
Payer: MEDICARE

## 2024-07-29 VITALS
HEIGHT: 65 IN | BODY MASS INDEX: 25.27 KG/M2 | TEMPERATURE: 97.8 F | OXYGEN SATURATION: 98 % | HEART RATE: 70 BPM | DIASTOLIC BLOOD PRESSURE: 86 MMHG | SYSTOLIC BLOOD PRESSURE: 134 MMHG | RESPIRATION RATE: 16 BRPM | WEIGHT: 151.7 LBS

## 2024-07-29 DIAGNOSIS — G25.81 RESTLESS LEGS SYNDROME (RLS): ICD-10-CM

## 2024-07-29 DIAGNOSIS — M85.852 OSTEOPENIA OF BOTH HIPS: ICD-10-CM

## 2024-07-29 DIAGNOSIS — R20.2 PARESTHESIA OF BILATERAL LEGS: ICD-10-CM

## 2024-07-29 DIAGNOSIS — M48.061 SPINAL STENOSIS OF LUMBAR REGION WITHOUT NEUROGENIC CLAUDICATION: ICD-10-CM

## 2024-07-29 DIAGNOSIS — E78.5 HYPERLIPIDEMIA LDL GOAL <160: ICD-10-CM

## 2024-07-29 DIAGNOSIS — E55.9 VITAMIN D DEFICIENCY: ICD-10-CM

## 2024-07-29 DIAGNOSIS — G43.809 OTHER MIGRAINE WITHOUT STATUS MIGRAINOSUS, NOT INTRACTABLE: ICD-10-CM

## 2024-07-29 DIAGNOSIS — D12.6 ADENOMATOUS POLYP OF COLON, UNSPECIFIED PART OF COLON: ICD-10-CM

## 2024-07-29 DIAGNOSIS — Z00.00 ENCOUNTER FOR MEDICARE ANNUAL WELLNESS EXAM: Primary | ICD-10-CM

## 2024-07-29 DIAGNOSIS — M85.851 OSTEOPENIA OF BOTH HIPS: ICD-10-CM

## 2024-07-29 LAB
ALBUMIN SERPL BCG-MCNC: 4.4 G/DL (ref 3.5–5.2)
ALP SERPL-CCNC: 47 U/L (ref 40–150)
ALT SERPL W P-5'-P-CCNC: 13 U/L (ref 0–50)
ANION GAP SERPL CALCULATED.3IONS-SCNC: 11 MMOL/L (ref 7–15)
AST SERPL W P-5'-P-CCNC: 25 U/L (ref 0–45)
BILIRUB SERPL-MCNC: 0.2 MG/DL
BUN SERPL-MCNC: 24.2 MG/DL (ref 8–23)
CALCIUM SERPL-MCNC: 9.4 MG/DL (ref 8.8–10.4)
CHLORIDE SERPL-SCNC: 101 MMOL/L (ref 98–107)
CHOLEST SERPL-MCNC: 216 MG/DL
CREAT SERPL-MCNC: 0.89 MG/DL (ref 0.51–0.95)
EGFRCR SERPLBLD CKD-EPI 2021: 66 ML/MIN/1.73M2
ERYTHROCYTE [DISTWIDTH] IN BLOOD BY AUTOMATED COUNT: 12.4 % (ref 10–15)
FASTING STATUS PATIENT QL REPORTED: NO
FASTING STATUS PATIENT QL REPORTED: NO
GLUCOSE SERPL-MCNC: 85 MG/DL (ref 70–99)
HCO3 SERPL-SCNC: 26 MMOL/L (ref 22–29)
HCT VFR BLD AUTO: 38.2 % (ref 35–47)
HDLC SERPL-MCNC: 70 MG/DL
HGB BLD-MCNC: 12 G/DL (ref 11.7–15.7)
LDLC SERPL CALC-MCNC: 124 MG/DL
MCH RBC QN AUTO: 28.7 PG (ref 26.5–33)
MCHC RBC AUTO-ENTMCNC: 31.4 G/DL (ref 31.5–36.5)
MCV RBC AUTO: 91 FL (ref 78–100)
NONHDLC SERPL-MCNC: 146 MG/DL
PLATELET # BLD AUTO: 333 10E3/UL (ref 150–450)
POTASSIUM SERPL-SCNC: 5.1 MMOL/L (ref 3.4–5.3)
PROT SERPL-MCNC: 6.6 G/DL (ref 6.4–8.3)
RBC # BLD AUTO: 4.18 10E6/UL (ref 3.8–5.2)
SODIUM SERPL-SCNC: 138 MMOL/L (ref 135–145)
TRIGL SERPL-MCNC: 109 MG/DL
TSH SERPL DL<=0.005 MIU/L-ACNC: 1.12 UIU/ML (ref 0.3–4.2)
VIT D+METAB SERPL-MCNC: 58 NG/ML (ref 20–50)
WBC # BLD AUTO: 6.4 10E3/UL (ref 4–11)

## 2024-07-29 PROCEDURE — 36415 COLL VENOUS BLD VENIPUNCTURE: CPT | Performed by: INTERNAL MEDICINE

## 2024-07-29 PROCEDURE — 84443 ASSAY THYROID STIM HORMONE: CPT | Performed by: INTERNAL MEDICINE

## 2024-07-29 PROCEDURE — G0439 PPPS, SUBSEQ VISIT: HCPCS | Performed by: INTERNAL MEDICINE

## 2024-07-29 PROCEDURE — 80061 LIPID PANEL: CPT | Performed by: INTERNAL MEDICINE

## 2024-07-29 PROCEDURE — 99214 OFFICE O/P EST MOD 30 MIN: CPT | Mod: 25 | Performed by: INTERNAL MEDICINE

## 2024-07-29 PROCEDURE — 80053 COMPREHEN METABOLIC PANEL: CPT | Performed by: INTERNAL MEDICINE

## 2024-07-29 PROCEDURE — 82306 VITAMIN D 25 HYDROXY: CPT | Performed by: INTERNAL MEDICINE

## 2024-07-29 PROCEDURE — 85027 COMPLETE CBC AUTOMATED: CPT | Performed by: INTERNAL MEDICINE

## 2024-07-29 RX ORDER — ROPINIROLE 0.25 MG/1
0.25 TABLET, FILM COATED ORAL
Qty: 10 TABLET | Refills: 0 | Status: SHIPPED | OUTPATIENT
Start: 2024-07-29

## 2024-07-29 SDOH — HEALTH STABILITY: PHYSICAL HEALTH: ON AVERAGE, HOW MANY DAYS PER WEEK DO YOU ENGAGE IN MODERATE TO STRENUOUS EXERCISE (LIKE A BRISK WALK)?: 3 DAYS

## 2024-07-29 ASSESSMENT — PAIN SCALES - GENERAL: PAINLEVEL: MILD PAIN (2)

## 2024-07-29 ASSESSMENT — SOCIAL DETERMINANTS OF HEALTH (SDOH): HOW OFTEN DO YOU GET TOGETHER WITH FRIENDS OR RELATIVES?: ONCE A WEEK

## 2024-07-29 NOTE — PROGRESS NOTES
Preventive Care Visit  [facility] VANNESA Jones MD, Internal Medicine  Jul 29, 2024          Karin Domingo is a 79 year old, presenting for the following:    She presents with her  and overall is doing well but unfortunately has to have back surgery which she will be doing in September at the [facility].  She is up-to-date with gynecology and they do a bone density testing.  She will have a colon exam next year.  Her migraines have been very good.    She does note intermittent restless leg syndrome.  She has not had it since January and the last time was on a plane.  She would like a prescription for Requip which her sister has used with good success.  She does not have claudication and during the day it is fine.    She is not exercising as much due to her back pain.               Past Medical History:      Past Medical History:   Diagnosis Date    Adenomatous polyp of colon 2002    fu done 2011 and to fu 2014, fu done 2014 and to fu 2019, fu done 2020 and to fu 5 years    Chest discomfort 05/2023    neg est echo    Hypercholesteraemia     Migraines     Osteopenia 2005    fu 2009 better, -0.4 spine and -1.1 fem neck, has fu with gyn; had fu dexa with gyn 2020; added fosamax 2/16/21, never started it, managed by gyn    PONV (postoperative nausea and vomiting)     Screening 2005    abd us neg for aaa due to fh    Spinal stenosis     on neurontin, mri done 6/19 at [facility], seen by nsurgery x 2    Spondylolistheses     Tonsillar abscess 1992    Vitamin D deficiency 2014             Past Surgical History:      Past Surgical History:   Procedure Laterality Date    breast biopsies      twice    HYSTERECTOMY, PAP NO LONGER INDICATED  1998    due to hyperplasia    right knee arthroscopy  1998    right knee replacement  2016    [place]    ROTATOR CUFF REPAIR RT/LT  2007    STRIP VEIN  2010             Social History:     Social History     Socioeconomic History    Marital status:       Spouse name: Not on file    Number of children: 3    Years of education: Not on file    Highest education level: Not on file   Occupational History    Not on file   Tobacco Use    Smoking status: Never    Smokeless tobacco: Never   Substance and Sexual Activity    Alcohol use: Yes     Alcohol/week: 7.0 standard drinks of alcohol     Types: 7 Standard drinks or equivalent per week    Drug use: No    Sexual activity: Yes     Partners: Male   Other Topics Concern    Parent/sibling w/ CABG, MI or angioplasty before 65F 55M? Not Asked   Social History Narrative    Not on file     Social Determinants of Health     Financial Resource Strain: Low Risk  (7/29/2024)    Financial Resource Strain     Within the past 12 months, have you or your family members you live with been unable to get utilities (heat, electricity) when it was really needed?: No   Food Insecurity: Low Risk  (7/29/2024)    Food Insecurity     Within the past 12 months, did you worry that your food would run out before you got money to buy more?: No     Within the past 12 months, did the food you bought just not last and you didn t have money to get more?: No   Transportation Needs: Low Risk  (7/29/2024)    Transportation Needs     Within the past 12 months, has lack of transportation kept you from medical appointments, getting your medicines, non-medical meetings or appointments, work, or from getting things that you need?: No   Physical Activity: Unknown (7/29/2024)    Exercise Vital Sign     Days of Exercise per Week: 3 days     Minutes of Exercise per Session: Not on file   Stress: No Stress Concern Present (7/29/2024)    [place] [employer] - Occupational Stress Questionnaire     Feeling of Stress : Not at all   Social Connections: Unknown (7/29/2024)    Social Connection and Isolation Panel [NHANES]     Frequency of Communication with Friends and Family: Not on file     Frequency of Social Gatherings with Friends and Family: Once a  "week     Attends [employer] Services: Not on file     Active Member of Clubs or Organizations: Not on file     Attends Club or Organization Meetings: Not on file     Marital Status: Not on file   Interpersonal Safety: Not on file   Housing Stability: Low Risk  (7/29/2024)    Housing Stability     Do you have housing? : Yes     Are you worried about losing your housing?: No             Family History:   reviewed         Allergies:     Allergies   Allergen Reactions    Succinylcholine      Gets paralyzed    Nickel Rash             Medications:     Current Outpatient Medications   Medication Sig Dispense Refill    acetaminophen (TYLENOL) 500 MG tablet Take 500 mg by mouth 3 times daily      Calcium Carbonate-Vitamin D (CALCIUM + D PO) Take 2 capsules by mouth daily      conjugated estrogens (PREMARIN) vaginal cream Place 1 g vaginally twice a week      gabapentin (NEURONTIN) 300 MG capsule TAKE 2 CAPSULES THREE TIMES A  capsule 7    glucosamine 500 MG CAPS capsule Take 500 mg by mouth daily      ibuprofen (ADVIL/MOTRIN) 200 MG capsule Take 200 mg by mouth 3 times daily      rOPINIRole (REQUIP) 0.25 MG tablet Take 1 tablet (0.25 mg) by mouth nightly as needed 10 tablet 0    SUMAtriptan (IMITREX) 50 MG tablet TAKE 1 TABLET AS NEEDED FOR MIGRAINE (DUE FOR A PHYSICAL IN MAY) Strength: 50 mg 18 tablet 11    vitamin D3 (CHOLECALCIFEROL) 50 mcg (2000 units) tablet Take 1 tablet by mouth 2 times daily                 Review of Systems:     The 10 point Review of Systems is negative other than noted in the HPI           Physical Exam:   Blood pressure 134/86, pulse 70, temperature 97.8  F (36.6  C), temperature source Temporal, resp. rate 16, height 1.66 m (5' 5.35\"), weight 68.8 kg (151 lb 11.2 oz), SpO2 98%, not currently breastfeeding.    Exam:  Constitutional: healthy appearing, alert and in no distress  Heent: Normocephalic. Head without obvious masses or lesions. PERRLDC, EOMI. Mouth exam within normal limits: " tongue, mucous membranes, posterior pharynx all normal, no lesions or abnormalities seen.  Tm's and canals within normal limits bilaterally. Neck supple, no nuchal rigidity or masses. No supraclavicular, or cervical adenopathy. Thyroid symmetric, no masses.  Cardiovascular: Regular rate and rhythm, no murmer, rub or gallops.  JVP not elevated, no edema.  Carotids within normal limits bilaterally, no bruits.  Respiratory: Normal respiratory effort.  Lungs clear, normal flow, no wheezing or crackles.  Gastrointestinal: Normal active bowel sounds.   Soft, not tender, no masses, guarding or rebound.  No hepatosplenomegaly.   Musculoskeletal: extremities normal, no gross deformities noted.  Skin: no suspicious lesions or rashes   Neurologic: Mental status within normal limits.  Speech fluent.  No gross motor abnormalities and gait intact.  Psychiatric: mentation appears normal and affect normal.         Data:   Labs sent        Assessment:   Normal complete physical exam  Presumed restless leg syndrome, pulses are normal, doubt related to spine or vascular.  I recommended good hydration and trying Gatorade prior to a flight but she can use Requip as needed  I sent in the prescription.  I will check labs and thyroid today.  Spinal stenosis, surgery at [place]  Osteopenia, treatment per GYN  Hyperlipidemia, labs today  Vitamin D deficiency, labs today  Colon polyp, colonoscopy next year  Migraines, stable  Healthcare maintenance         Plan:   COVID shot at pharmacy  Exercise and diet  Letter with labs  Regarding restless legs check labs and treatment as noted above      Marco Jones M.D.            Physical        7/29/2024    10:53 AM   Additional Questions   Roomed by bashir   Accompanied by spouse         Health Care Directive  Patient does not have a Health Care Directive or Living Will: Discussed advance care planning with patient; information given to patient to review.    HPI              7/29/2024   General  Health   How would you rate your overall physical health? Excellent   Feel stress (tense, anxious, or unable to sleep) Not at all            7/29/2024   Nutrition   Diet: Regular (no restrictions)            7/29/2024   Exercise   Days per week of moderate/strenous exercise 3 days            7/29/2024   Social Factors   Frequency of gathering with friends or relatives Once a week   Worry food won't last until get money to buy more No   Food not last or not have enough money for food? No   Do you have housing? (Housing is defined as stable permanent housing and does not include staying ouside in a car, in a tent, in an abandoned building, in an overnight shelter, or couch-surfing.) Yes   Are you worried about losing your housing? No   Lack of transportation? No   Unable to get utilities (heat,electricity)? No            7/29/2024   Fall Risk   Fallen 2 or more times in the past year? No   Trouble with walking or balance? Yes   Reason Gait Speed Test Not Completed Patient declines             7/29/2024   Activities of Daily Living- Home Safety   Needs help with the following daily activites Housework   Safety concerns in the home None of the above            7/29/2024   Dental   Dentist two times every year? Yes            7/29/2024   Hearing Screening   Hearing concerns? None of the above            7/29/2024   Driving Risk Screening   Patient/family members have concerns about driving No            7/29/2024   General Alertness/Fatigue Screening   Have you been more tired than usual lately? No            7/29/2024   Urinary Incontinence Screening   Bothered by leaking urine in past 6 months No            7/29/2024   TB Screening   Were you born outside of the [place]? No            Today's PHQ-2 Score:       7/29/2024    10:48 AM   PHQ-2 ( 1999 Pf[employer])   Q1: Little interest or pleasure in doing things 0   Q2: Feeling down, depressed or hopeless 0   PHQ-2 Score 0   Q1: Little interest or pleasure in doing things Not at  all   Q2: Feeling down, depressed or hopeless Not at all   PHQ-2 Score 0           7/29/2024   Substance Use   Alcohol more than 3/day or more than 7/wk No   Do you have a current opioid prescription? No   How severe/bad is pain from 1 to 10? 8/10   Do you use any other substances recreationally? No        Social History     Tobacco Use    Smoking status: Never    Smokeless tobacco: Never   Substance Use Topics    Alcohol use: Yes     Alcohol/week: 7.0 standard drinks of alcohol     Types: 7 Standard drinks or equivalent per week    Drug use: No           7/23/2021   LAST FHS-7 RESULTS   1st degree relative breast or ovarian cancer No   Any relative bilateral breast cancer No   Any male have breast cancer No   Any ONE woman have BOTH breast AND ovarian cancer No   Any woman with breast cancer before 50yrs No   2 or more relatives with breast AND/OR ovarian cancer No   2 or more relatives with breast AND/OR bowel cancer No               ASCVD Risk   The 10-year ASCVD risk score (Eloise COLÓN, et al., 2019) is: 29.5%    Values used to calculate the score:      Age: 79 years      Sex: Female      Is Non- : No      Diabetic: No      Tobacco smoker: No      Systolic Blood Pressure: 159 mmHg      Is BP treated: No      HDL Cholesterol: 81 mg/dL      Total Cholesterol: 257 mg/dL            Reviewed and updated as needed this visit by Provider       [facility]                Current providers sharing in care for this patient include:  Patient Care Team:  Marco Jones MD as PCP - General (Internal Medicine)  Sandra Hector MD as MD (OB/Gyn)  Marco Jones MD as Assigned PCP  Van Riggs MD as Assigned Neuroscience Provider    The following health maintenance items are reviewed in Epic and correct as of today:  Health Maintenance   Topic Date Due    ANNUAL REVIEW OF  ORDERS  Never done    RSV VACCINE (Pregnancy & 60+) (1 - 1-dose 60+ series) Never done    LIPID  " 11/26/2023    COVID-19 Vaccine (7 - 2023-24 season) 02/17/2024    INFLUENZA VACCINE (1) 09/01/2024    MEDICARE ANNUAL WELLNESS VISIT  07/29/2025    FALL RISK ASSESSMENT  07/29/2025    GLUCOSE  11/26/2025    ADVANCE CARE PLANNING  07/29/2029    DTAP/TDAP/TD IMMUNIZATION (3 - Td or Tdap) 11/21/2032    DEXA  12/07/2038    HEPATITIS C SCREENING  Completed    PHQ-2 (once per calendar year)  Completed    Pneumococcal Vaccine: 65+ Years  Completed    ZOSTER IMMUNIZATION  Completed    IPV IMMUNIZATION  Aged Out    HPV IMMUNIZATION  Aged Out    MENINGITIS IMMUNIZATION  Aged Out    RSV MONOCLONAL ANTIBODY  Aged Out    MAMMO SCREENING  Discontinued    COLORECTAL CANCER SCREENING  Discontinued            Objective    Exam  BP (!) 159/78 (BP Location: Right arm, Cuff Size: Adult Regular)   Pulse 50   Temp 97.8  F (36.6  C) (Temporal)   Resp 16   Ht 1.66 m (5' 5.35\")   Wt 68.8 kg (151 lb 11.2 oz)   SpO2 98%   BMI 24.97 kg/m     Estimated body mass index is 24.97 kg/m  as calculated from the following:    Height as of this encounter: 1.66 m (5' 5.35\").    Weight as of this encounter: 68.8 kg (151 lb 11.2 oz).    Physical Exam           7/29/2024   Mini Cog   Clock Draw Score 2 Normal   3 Item Recall 3 objects recalled   Mini Cog Total Score 5                 Signed Electronically by: Marco Jones MD    "

## 2024-07-29 NOTE — PATIENT INSTRUCTIONS
I would get a covid shot at your pharmact  Patient Education   Preventive Care Advice   This is general advice given by our system to help you stay healthy. However, your care team may have specific advice just for you. Please talk to your care team about your preventive care needs.  Nutrition  Eat 5 or more servings of fruits and vegetables each day.  Try wheat bread, brown rice and whole grain pasta (instead of white bread, rice, and pasta).  Get enough calcium and vitamin D. Check the label on foods and aim for 100% of the RDA (recommended daily allowance).  Lifestyle  Exercise at least 150 minutes each week  (30 minutes a day, 5 days a week).  Do muscle strengthening activities 2 days a week. These help control your weight and prevent disease.  No smoking.  Wear sunscreen to prevent skin cancer.  Have a dental exam and cleaning every 6 months.  Yearly exams  See your health care team every year to talk about:  Any changes in your health.  Any medicines your care team has prescribed.  Preventive care, family planning, and ways to prevent chronic diseases.  Shots (vaccines)   HPV shots (up to age 26), if you've never had them before.  Hepatitis B shots (up to age 59), if you've never had them before.  COVID-19 shot: Get this shot when it's due.  Flu shot: Get a flu shot every year.  Tetanus shot: Get a tetanus shot every 10 years.  Pneumococcal, hepatitis A, and RSV shots: Ask your care team if you need these based on your risk.  Shingles shot (for age 50 and up)  General health tests  Diabetes screening:  Starting at age 35, Get screened for diabetes at least every 3 years.  If you are younger than age 35, ask your care team if you should be screened for diabetes.  Cholesterol test: At age 39, start having a cholesterol test every 5 years, or more often if advised.  Bone density scan (DEXA): At age 50, ask your care team if you should have this scan for osteoporosis (brittle bones).  Hepatitis C: Get tested at  least once in your life.  STIs (sexually transmitted infections)  Before age 24: Ask your care team if you should be screened for STIs.  After age 24: Get screened for STIs if you're at risk. You are at risk for STIs (including HIV) if:  You are sexually active with more than one person.  You don't use condoms every time.  You or a partner was diagnosed with a sexually transmitted infection.  If you are at risk for HIV, ask about PrEP medicine to prevent HIV.  Get tested for HIV at least once in your life, whether you are at risk for HIV or not.  Cancer screening tests  Cervical cancer screening: If you have a cervix, begin getting regular cervical cancer screening tests starting at age 21.  Breast cancer scan (mammogram): If you've ever had breasts, begin having regular mammograms starting at age 40. This is a scan to check for breast cancer.  Colon cancer screening: It is important to start screening for colon cancer at age 45.  Have a colonoscopy test every 10 years (or more often if you're at risk) Or, ask your provider about stool tests like a FIT test every year or Cologuard test every 3 years.  To learn more about your testing options, visit:   .  For help making a decision, visit:   https://bit.ly/py46092.  Prostate cancer screening test: If you have a prostate, ask your care team if a prostate cancer screening test (PSA) at age 55 is right for you.  Lung cancer screening: If you are a current or former smoker ages 50 to 80, ask your care team if ongoing lung cancer screenings are right for you.  For informational purposes only. Not to replace the advice of your health care provider. Copyright   2023 [facility]. All rights reserved. Clinically reviewed by the [facility]. ShipEarly [id number] - REV 01/24.  Learning About Activities of Daily Living  What are activities of daily living?     Activities of daily living (ADLs) are the basic self-care tasks you do every day.  These include eating, bathing, dressing, and moving around.  As you age, and if you have health problems, you may find that it's harder to do some of these tasks. If so, your doctor can suggest ideas that may help.  To measure what kind of help you may need, your doctor will ask how well you are able to do ADLs. Let your doctor know if there are any tasks that you are having trouble doing. This is an important first step to getting help. And when you have the help you need, you can stay as independent as possible.  How will a doctor assess your ADLs?  Asking about ADLs is part of a routine health checkup your doctor will likely do as you age. Your health check might be done in a doctor's office, in your home, or at a hospital. The goal is to find out if you are having any problems that could make it hard to care for yourself or that make it unsafe for you to be on your own.  To measure your ADLs, your doctor will ask how hard it is for you to do routine tasks. Your doctor may also want to know if you have changed the way you do a task because of a health problem. Your doctor may watch how you:  Walk back and forth.  Keep your balance while you stand or walk.  Move from sitting to standing or from a bed to a chair.  Button or unbutton a shirt or sweater.  Remove and put on your shoes.  It's common to feel a little worried or anxious if you find you can't do all the things you used to be able to do. Talking with your doctor about ADLs is a way to make sure you're as safe as possible and able to care for yourself as well as you can. You may want to bring a caregiver, friend, or family member to your checkup. They can help you talk to your doctor.  Follow-up care is a key part of your treatment and safety. Be sure to make and go to all appointments, and call your doctor if you are having problems. It's also a good idea to know your test results and keep a list of the medicines you take.  Current as of: October 24, 2023                Content Version: 14.0    3053-2337 [employer].   Care instructions adapted under license by your healthcare professional. If you have questions about a medical condition or this instruction, always ask your healthcare professional. [employer] disclaims any warranty or liability for your use of this information.      Preventing Falls: Care Instructions  Injuries and health problems such as trouble walking or poor eyesight can increase your risk of falling. So can some medicines. But there are things you can do to help prevent falls. You can exercise to get stronger. You can also arrange your home to make it safer.    Talk to your doctor about the medicines you take. Ask if any of them increase the risk of falls and whether they can be changed or stopped.   Try to exercise regularly. It can help improve your strength and balance. This can help lower your risk of falling.     Practice fall safety and prevention.    Wear low-heeled shoes that fit well and give your feet good support. Talk to your doctor if you have foot problems that make this hard.  Carry a cellphone or wear a medical alert device that you can use to call for help.  Use stepladders instead of chairs to reach high objects. Don't climb if you're at risk for falls. Ask for help, if needed.  Wear the correct eyeglasses, if you need them.    Make your home safer.    Remove rugs, cords, clutter, and furniture from walkways.  Keep your house well lit. Use night-lights in hallways and bathrooms.  Install and use sturdy handrails on stairways.  Wear nonskid footwear, even inside. Don't walk barefoot or in socks without shoes.    Be safe outside.    Use handrails, curb cuts, and ramps whenever possible.  Keep your hands free by using a shoulder bag or backpack.  Try to walk in well-lit areas. Watch out for uneven ground, changes in pavement, and debris.  Be careful in the winter. Walk on the grass or gravel when sidewalks  "are slippery. Use de-icer on steps and walkways. Add non-slip devices to shoes.    Put grab bars and nonskid mats in your shower or tub and near the toilet. Try to use a shower chair or bath bench when bathing.   Get into a tub or shower by putting in your weaker leg first. Get out with your strong side first. Have a phone or medical alert device in the bathroom with you.   Where can you learn more?  Go to https://www.[employer].net/patiented  Enter G117 in the search box to learn more about \"Preventing Falls: Care Instructions.\"  Current as of: July 17, 2023               Content Version: 14.0    8743-3759 [email or web address].   Care instructions adapted under license by your healthcare professional. If you have questions about a medical condition or this instruction, always ask your healthcare professional. [employer] disclaims any warranty or liability for your use of this information.         "

## 2024-07-30 NOTE — RESULT ENCOUNTER NOTE
It was very nice seeing you.  You should be able to view your test results.    Your CBC or blood count looks fine with no signs of anemia or blood disorders.  Your chemistry panel shows no diabetes.  Your blood salts, kidney tests, liver tests, proteins, thyroid test, and vitamin D are all fine.    Your total cholesterol is improved at 216.  Your HDL or good cholesterol is very good and the LDL or bad is just fine as well.    I am happy to bring you this overall excellent report.  Please let me know if you have questions.    Marco Jones M.D.

## 2024-09-26 ENCOUNTER — NURSE TRIAGE (OUTPATIENT)
Dept: FAMILY MEDICINE | Facility: CLINIC | Age: 79
End: 2024-09-26
Payer: MEDICARE

## 2024-09-26 DIAGNOSIS — G89.18 ACUTE POST-OPERATIVE PAIN: Primary | ICD-10-CM

## 2024-09-26 NOTE — TELEPHONE ENCOUNTER
"Pt states she just had back surgery at [place] and has been home about a week.     Pt states she developed \"some sort of bug\". Pt states she is throwing up and nauseated. Pt states she is not on pain meds anymore, just Tylenol and muscle relaxer.     Pt is asking for suppositories of tylenol prescribed because of the nausea. Pt states she is taking 1000mg doses 4x a day orally. Pended pharmacy if appropriate.    Pt states she does not throw up much but is nauseated. States she is probably not getting enough fluids during the day. Pt states that she has been taking Zofran from the surgeon she states it seems to be helping.   RN encouraged pt to take the Zofran at this time so she can get enough fluids in during the day. Fluids and protein are important for wound healing after a surgery. RN instructed patient to call us if the nausea gets worse. RN instructed pt to call the surgeons office to talk to their triage as well to let them know what is going on since it is so soon after surgery.    Natacha Javier RN    "

## 2024-09-26 NOTE — TELEPHONE ENCOUNTER
Nurse Triage SBAR    Is this a 2nd Level Triage? NO    Situation: Pt is requesting Tylenol suppositories in case she needs them post back surgery.     Background: Pt had back surgery at [facility].     Assessment: Pt denies vomiting, fever, dizziness or severe diarrhea. She has nausea and one episode of diarrhea yesterday. She doesn't feel she needs IV fluids. She just wants Rx for tylenol suppositories.    Pt currently takes Tylenol 1000mg doses 4x a day orally.     Protocol Recommended Disposition:     Recommendation: Plush fluids. Call triage back if new or worsening symptoms.       Routed to provider Should pt contact surgeon for Tylenol suppositories? Triage unclear what Tylenol strength to pend.     Does the patient meet one of the following criteria for ADS visit consideration? No    Pt need a call back with providers advice.         Reason for Disposition    Taking prescription medication that could cause nausea (e.g., narcotics/opiates, antibiotics, OCPs, many others)    Additional Information    Negative: Shock suspected (e.g., cold/pale/clammy skin, too weak to stand, low BP, rapid pulse)    Negative: Sounds like a life-threatening emergency to the triager    Negative: Nausea or vomiting and pregnancy < 20 weeks    Negative: Menstrual Period - Missed or Late (i.e., pregnancy suspected)    Negative: Heat exhaustion suspected (i.e., dehydration from heat exposure)    Negative: Anxiety or stress suspected (i.e., nausea with anxiety attacks or stressful situations)    Negative: Traumatic Brain Injury (TBI) suspected    Negative: Vomiting occurs    Negative: Other symptom is present, see that guideline.  (e.g., chest pain, headache, dizziness, abdominal pain, colds, sore throat, etc.).    Negative: Unable to walk, or can only walk with assistance (e.g., requires support)    Negative: Difficulty breathing    Negative: Insulin-dependent diabetes (Type I) and glucose > 400 mg/dL (22 mmol/L)    Negative: Drinking  very little and dehydration suspected (e.g., no urine > 12 hours, very dry mouth, very lightheaded)    Negative: Patient sounds very sick or weak to the triager    Negative: Fever > 104 F  (40 C)    Negative: Fever > 101 F  (38.3 C) and over 60 years of age    Negative: Fever > 100.0 F  (37.8 C) and bedridden (e.g., CVA, chronic illness, recovering from surgery)    Negative: Fever > 100.0 F  (37.8 C) and diabetes mellitus or weak immune system (e.g., HIV positive, cancer chemo, splenectomy, chronic steroids)    Negative: Taking any of the following medications: digoxin (Lanoxin), lithium, theophylline, phenytoin (Dilantin)    Negative: Yellowish color of the skin or white of the eye (i.e., jaundice)    Negative: Fever present > 3 days (72 hours)    Negative: Patient wants to be seen    Protocols used: Nausea-A-OH

## 2024-09-26 NOTE — TELEPHONE ENCOUNTER
Marco Jones MD Doerrmann, Caitlin, RN; [facility]9 minutes ago (11:55 AM)     PG  I sent in the suppositories.  If she worsen she should call us or go to the ER.  If she has a fever she should let us know as well.    Thank you     Pt was called with providers message above. She verbalized understanding, and agreement with plan.

## 2024-09-26 NOTE — TELEPHONE ENCOUNTER
Please see message below. Pt declined ADS. Please advice if rx for Rx Tylenol for suppositories can be ordered.

## 2024-09-26 NOTE — TELEPHONE ENCOUNTER
Pt told RN no that she didn't need IV fluids. Pt called back and now she says that she thinks she does after considering it. Pt was under the impression that the IV fluids would be a home-service. Writer explained that this was ADS and explained the qualifications and potentially extended time of appointment -- pt declined, pt does not want to leave the house as mobility is pretty limited still. Pt states she takes short frequent walks around the house. Unsure if able to get on exam table. Writer explained if she can walk around the house she would likely be able to get on an exam table as they are electric and can be raised/lowered. Pt declined ADS as she does not want to leave her house, she thinks the zofran and tylenol suppository would help. Would PCP prescribe tylenol suppository.      Natacha Javier RN

## 2024-09-30 ENCOUNTER — TELEPHONE (OUTPATIENT)
Dept: FAMILY MEDICINE | Facility: CLINIC | Age: 79
End: 2024-09-30
Payer: MEDICARE

## 2024-09-30 RX ORDER — ONDANSETRON 4 MG/1
4 TABLET, ORALLY DISINTEGRATING ORAL EVERY 8 HOURS PRN
Qty: 30 TABLET | Refills: 0 | Status: CANCELLED | OUTPATIENT
Start: 2024-09-30

## 2024-09-30 NOTE — TELEPHONE ENCOUNTER
"Pt has nausea and wants a prescription for Zofran. She had back surgery 09/16/2024 and was discharged with a packet of Zofran but now is out of medication. Pt is no longer taking \"narcotics\". She denies vomiting, fever or dizziness. Pt takes one Zofran tablet daily.     Pt agreed to follow up with [facility] re: Rx refill today. Please advice on rx approval.   "
Since she is off narcotics and her back surgery was 2 weeks ago I think she needs to be seen to figure out why she has ongoing nausea.  Please have her see team tomorrow or Wednesday.    Thank you    Marco Jones M.D.    
Spoke with pt message given appt scheduled for 10/1/024 With Kylie Hughes  
no

## 2024-10-01 ENCOUNTER — VIRTUAL VISIT (OUTPATIENT)
Dept: FAMILY MEDICINE | Facility: CLINIC | Age: 79
End: 2024-10-01
Payer: MEDICARE

## 2024-10-01 DIAGNOSIS — R11.0 POSTOPERATIVE NAUSEA: Primary | ICD-10-CM

## 2024-10-01 DIAGNOSIS — Z98.890 POSTOPERATIVE NAUSEA: Primary | ICD-10-CM

## 2024-10-01 PROCEDURE — 99441 PR PHYSICIAN TELEPHONE EVALUATION 5-10 MIN: CPT | Mod: 93 | Performed by: PHYSICIAN ASSISTANT

## 2024-10-01 RX ORDER — ONDANSETRON 4 MG/1
4 TABLET, ORALLY DISINTEGRATING ORAL EVERY 8 HOURS PRN
Qty: 10 TABLET | Refills: 0 | Status: SHIPPED | OUTPATIENT
Start: 2024-10-01

## 2024-10-01 NOTE — PROGRESS NOTES
Chayo is a 79 year old who is being evaluated via a billable telephone visit.    What phone number would you like to be contacted at? [phone]  How would you like to obtain your AVS? Mail a copy  Originating Location (pt. Location): Home    Distant Location (provider location):  On-site        Subjective   Chayo is a 79 year old, presenting for the following health issues:  Nausea (Nausea continues in the AM after surgery 2 weeks ago, no vomiting, refill zofran )    HPI   The nausea is very mild and mostly in the morning  Zofran works very well and she'd like a refill  No diarrhea or abd pain  She feels this is just still from meds/anesthesia following her back surgery  She hasn't been on any narcotics for a week          Objective    Vitals - Patient Reported  Pain Score: Mild Pain (2)      Vitals:  No vitals were obtained today due to virtual visit.    Physical Exam   General: Alert and no distress //Respiratory: No audible wheeze, cough, or shortness of breath // Psychiatric:  Appropriate affect, tone, and pace of words      Assessment and Plan:     (R11.0,  Z98.890) Postoperative nausea  (primary encounter diagnosis)  Comment: sxs have improved and she'd just like a few more zofran on hand  Plan: ondansetron (ZOFRAN ODT) 4 MG ODT tab        Refilled.           Phone call duration: 5 minutes  Signed Electronically by: Kylie Hughes PA-C

## 2024-10-03 NOTE — TELEPHONE ENCOUNTER
Nurse Triage SBAR    Is this a 2nd Level Triage? YES, LICENSED PRACTITIONER REVIEW IS REQUIRED    Situation:   Patient states that she felt pain in her right calf yesterday when she raised herself up with her right to reach into a cupboard. States that at this time it is only feels it if she pushes on the small area on the back of her right calf. Denies redness or swelling. No known injury.     Background:   States that she had phlebitis a long time ago.    Assessment:   Minor right calf pain without redness or swelling.     Protocol Recommended Disposition:   Go To ED/UCC Now (Or To Office With PCP Approval)    Recommendation:   Please advise if OK to keep 1 pm appointment.      Routed to provider    Does the patient meet one of the following criteria for ADS visit consideration? 16+ years old, with an [facility] PCP     TIP  Providers, please consider if this condition is appropriate for management at one of our Acute and Diagnostic Services sites.     If patient is a good candidate, please use dotphrase <dot>triageresponse and select Refer to ADS to document.  Reason for Disposition    Thigh or calf pain in only one leg and present > 1 hour    Additional Information    Negative: Looks like a broken bone or dislocated joint (e.g., crooked or deformed)    Negative: Sounds like a life-threatening emergency to the triager    Negative: Followed a hip injury    Negative: Followed a knee injury    Negative: Followed an ankle or foot injury    Negative: Back pain radiating (shooting) into leg(s)    Negative: Foot pain is the main symptom    Negative: Ankle pain is the main symptom    Negative: Knee pain is the main symptom    Negative: Leg swelling is the main symptom    Negative: Chest pain    Negative: Difficulty breathing    Negative: Entire foot is cool or blue in comparison to other side    Negative: Unable to walk    Negative: Fever and red area (or area very tender to touch)    Negative: Fever and swollen  "joint    Answer Assessment - Initial Assessment Questions  1. ONSET: \"When did the pain start?\"       Started yesterday am  2. LOCATION: \"Where is the pain located?\"       Right leg, back of the calf, 1 small spot. Sore when stepped on foot. States that it is gone at the time of this call. Reaching up into cupboard when using just her right foot.   3. PAIN: \"How bad is the pain?\"    (Scale 1-10; or mild, moderate, severe)    -  MILD (1-3): doesn't interfere with normal activities     -  MODERATE (4-7): interferes with normal activities (e.g., work or school) or awakens from sleep, limping     -  SEVERE (8-10): excruciating pain, unable to do any normal activities, unable to walk      No pain today unless I push on the spot. Not painful to walk today.   4. WORK OR EXERCISE: \"Has there been any recent work or exercise that involved this part of the body?\"       Stood on right leg to reach up into the cupboard.   5. CAUSE: \"What do you think is causing the leg pain?\"      Distance history of phlebitis.   6. OTHER SYMPTOMS: \"Do you have any other symptoms?\" (e.g., chest pain, back pain, breathing difficulty, swelling, rash, fever, numbness, weakness)      none  7. PREGNANCY: \"Is there any chance you are pregnant?\" \"When was your last menstrual period?\"      NA    Protocols used: LEG PAIN-A-OH  Anay Song RN    " Low serum cortisol level

## 2024-12-03 ENCOUNTER — TELEPHONE (OUTPATIENT)
Dept: FAMILY MEDICINE | Facility: CLINIC | Age: 79
End: 2024-12-03
Payer: MEDICARE

## 2024-12-03 NOTE — TELEPHONE ENCOUNTER
Order/Referral Request    Who is requesting: Patient    Orders being requested: Hemoglobin    Reason service is needed/diagnosis: insomnia, low energy, light headiness  from time to time for a whole week, getting a better now but was told to get hemoglobin checked by PT and another provider    When are orders needed by: asap    Has this been discussed with Provider: No    Does patient have a preference on a Group/Provider/Facility? [facility]    Does patient have an appointment scheduled?: No    Where to send orders: Place orders within [facility] and let patient know    Could we send this information to you in [facility] or would you prefer to receive a phone call?:   Patient would prefer a phone call   Okay to leave a detailed message?: Yes at Cell number on file:    Telephone Information:   Mobile [phone]

## 2024-12-04 NOTE — TELEPHONE ENCOUNTER
"Writer contacts patient to triage patient for: \"insomnia, low energy, light headiness from time to time for a whole week, getting a better now but was told to get hemoglobin checked by PT and another provider\"    -Patient states that said symptoms have drastically improved since she last week.   -She specifies that she was \"suggested to have hemoglobin checked\", stating that she had \"lost a lot of blood\" from her surgery.  -Writer informed patient that she should schedule a VV with an available provider to discuss concerns.  -Patient refused OV, but agreed to schedule VV.   -Patient declined to visit with team provider and requested PCP.   -Writer informed patient that PCP is unavailable until 12/27/24. Patient requested to be scheduled for 12/27/24.  -Patient agrees to continue to monitor symptoms and will schedule earlier appointment with symptoms worsen.  "

## 2024-12-11 ENCOUNTER — TELEPHONE (OUTPATIENT)
Dept: FAMILY MEDICINE | Facility: CLINIC | Age: 79
End: 2024-12-11
Payer: MEDICARE

## 2024-12-11 DIAGNOSIS — G25.81 RESTLESS LEGS SYNDROME (RLS): ICD-10-CM

## 2024-12-11 NOTE — TELEPHONE ENCOUNTER
Reason for call:  Medication   If this is a refill request, has the caller requested the refill from the pharmacy already? No  Will the patient be using a [facility]? No  Name of [facility]    Name of the medication requested: requip 0.25   Can you fill more than 10 quantity'    Other request: refill    Phone number to reach patient:  Home number on file [phone] (home)    Best Time:  any    Can we leave a detailed message on this number?  YES    Travel screening: Not Applicable

## 2024-12-11 NOTE — TELEPHONE ENCOUNTER
Triage outreach    Attempt number 1    Left message to call back at [phone].    Naz RN  [facility]

## 2024-12-12 RX ORDER — ROPINIROLE 0.25 MG/1
0.25 TABLET, FILM COATED ORAL
Qty: 10 TABLET | Refills: 0 | Status: SHIPPED | OUTPATIENT
Start: 2024-12-12

## 2024-12-12 NOTE — TELEPHONE ENCOUNTER
Spoke with pt about medication refill on Requip. Pt verbalized that she has more sleepless nights/restless legs during the night, therefore taking more the usual. Pt requesting more then 10 tablets if possible since its been helping with her insomnia, if not then 10 tablets is ok. Rx and pharmacy pended for your review if appropriate. Please advise, thank you!     Moiz Reed RN  [facility]

## 2024-12-12 NOTE — TELEPHONE ENCOUNTER
Pt informed of Rx approval.    Orbicularis Oris Muscle Flap Text: The defect edges were debeveled with a #15 scalpel blade.  Given that the defect affected the competency of the oral sphincter an orbicularis oris muscle flap was deemed most appropriate to restore this competency and normal muscle function.  Using a sterile surgical marker, an appropriate flap was drawn incorporating the defect. The area thus outlined was incised with a #15 scalpel blade.

## 2024-12-27 PROBLEM — G25.81 RESTLESS LEGS SYNDROME (RLS): Status: ACTIVE | Noted: 2024-12-01

## 2024-12-30 ENCOUNTER — LAB (OUTPATIENT)
Dept: LAB | Facility: CLINIC | Age: 79
End: 2024-12-30
Payer: MEDICARE

## 2024-12-30 DIAGNOSIS — D64.9 LOW HEMOGLOBIN: ICD-10-CM

## 2024-12-30 DIAGNOSIS — G25.81 RESTLESS LEGS SYNDROME (RLS): ICD-10-CM

## 2024-12-30 DIAGNOSIS — R53.83 OTHER FATIGUE: ICD-10-CM

## 2024-12-30 LAB
ANION GAP SERPL CALCULATED.3IONS-SCNC: 9 MMOL/L (ref 7–15)
BUN SERPL-MCNC: 21.3 MG/DL (ref 8–23)
CALCIUM SERPL-MCNC: 9.2 MG/DL (ref 8.8–10.4)
CHLORIDE SERPL-SCNC: 101 MMOL/L (ref 98–107)
CREAT SERPL-MCNC: 0.86 MG/DL (ref 0.51–0.95)
EGFRCR SERPLBLD CKD-EPI 2021: 69 ML/MIN/1.73M2
ERYTHROCYTE [DISTWIDTH] IN BLOOD BY AUTOMATED COUNT: 14.6 % (ref 10–15)
FERRITIN SERPL-MCNC: 26 NG/ML (ref 11–328)
GLUCOSE SERPL-MCNC: 93 MG/DL (ref 70–99)
HCO3 SERPL-SCNC: 26 MMOL/L (ref 22–29)
HCT VFR BLD AUTO: 37.2 % (ref 35–47)
HGB BLD-MCNC: 11.4 G/DL (ref 11.7–15.7)
IRON BINDING CAPACITY (ROCHE): 300 UG/DL (ref 240–430)
IRON SATN MFR SERPL: 10 % (ref 15–46)
IRON SERPL-MCNC: 29 UG/DL (ref 37–145)
MCH RBC QN AUTO: 25.6 PG (ref 26.5–33)
MCHC RBC AUTO-ENTMCNC: 30.6 G/DL (ref 31.5–36.5)
MCV RBC AUTO: 83 FL (ref 78–100)
PLATELET # BLD AUTO: 388 10E3/UL (ref 150–450)
POTASSIUM SERPL-SCNC: 4.6 MMOL/L (ref 3.4–5.3)
RBC # BLD AUTO: 4.46 10E6/UL (ref 3.8–5.2)
SODIUM SERPL-SCNC: 136 MMOL/L (ref 135–145)
TSH SERPL DL<=0.005 MIU/L-ACNC: 1.09 UIU/ML (ref 0.3–4.2)
WBC # BLD AUTO: 6.5 10E3/UL (ref 4–11)

## 2024-12-30 PROCEDURE — 82728 ASSAY OF FERRITIN: CPT

## 2024-12-30 PROCEDURE — 85027 COMPLETE CBC AUTOMATED: CPT

## 2024-12-30 PROCEDURE — 83540 ASSAY OF IRON: CPT

## 2024-12-30 PROCEDURE — 83550 IRON BINDING TEST: CPT

## 2024-12-30 PROCEDURE — 80048 BASIC METABOLIC PNL TOTAL CA: CPT

## 2024-12-30 PROCEDURE — 36415 COLL VENOUS BLD VENIPUNCTURE: CPT

## 2024-12-30 PROCEDURE — 84443 ASSAY THYROID STIM HORMONE: CPT

## 2024-12-31 NOTE — RESULT ENCOUNTER NOTE
It was nice talking to you yesterday.  You should be able to view your test results.    The most significant finding is that your hemoglobin is low, I suspect due to your surgery.  Your thyroid test and chemistries are fine.  It appears that you are low on iron which is the reason your hemoglobin is low.  This can also affect the restless legs.  I would like you to start taking iron sulfate 325 mg which is over-the-counter once a day.  If you take it with a vitamin C pill it helps absorb it.  Please schedule follow-up with me in about 2 months and we can see how things are.    If you have questions let me know.    Marco Jones M.D.

## 2025-01-10 ENCOUNTER — HOSPITAL ENCOUNTER (OUTPATIENT)
Dept: CARDIOLOGY | Facility: CLINIC | Age: 80
Discharge: HOME | End: 2025-01-10
Attending: INTERNAL MEDICINE | Admitting: INTERNAL MEDICINE
Payer: MEDICARE

## 2025-01-10 DIAGNOSIS — R03.0 ELEVATED BP WITHOUT DIAGNOSIS OF HYPERTENSION: ICD-10-CM

## 2025-01-10 PROCEDURE — 93786 AMBL BP MNTR W/SW REC ONLY: CPT

## 2025-01-10 PROCEDURE — 93788 AMBL BP MNTR W/SW A/R: CPT

## 2025-01-16 NOTE — RESULT ENCOUNTER NOTE
Your blood pressure monitor shows normal blood pressure readings.  There is nothing further to do at this point.    Marco Jones M.D.

## 2025-01-26 DIAGNOSIS — G25.81 RESTLESS LEGS SYNDROME (RLS): ICD-10-CM

## 2025-01-27 RX ORDER — ROPINIROLE 0.25 MG/1
TABLET, FILM COATED ORAL
Qty: 30 TABLET | Refills: 0 | Status: SHIPPED | OUTPATIENT
Start: 2025-01-27

## 2025-01-27 NOTE — TELEPHONE ENCOUNTER
Prescription approved per [facility].  Jyothi Mitchell RN  [facility]

## 2025-02-04 ENCOUNTER — TRANSFERRED RECORDS (OUTPATIENT)
Dept: HEALTH INFORMATION MANAGEMENT | Facility: CLINIC | Age: 80
End: 2025-02-04
Payer: MEDICARE

## 2025-02-27 ENCOUNTER — TELEPHONE (OUTPATIENT)
Dept: FAMILY MEDICINE | Facility: CLINIC | Age: 80
End: 2025-02-27
Payer: MEDICARE

## 2025-02-27 DIAGNOSIS — D64.9 LOW HEMOGLOBIN: Primary | ICD-10-CM

## 2025-02-27 NOTE — TELEPHONE ENCOUNTER
Order/Referral Request    Who is requesting: patient     Orders being requested: recheck iron / hemoglobin     Reason service is needed/diagnosis: recheck    When are orders needed by: as soon as available     Has this been discussed with Provider: Yes    Does patient have a preference on a Group/Provider/Facility? [facility]    Does patient have an appointment scheduled?: Yes: 3/4/25     Where to send orders: Place orders within Epic Please call patient when orders are placed     Could we send this information to you in [id number] or would you prefer to receive a phone call?:   Patient would prefer a phone call   Okay to leave a detailed message?: Yes at Cell number on file:    Telephone Information:   Mobile [phone]

## 2025-03-04 ENCOUNTER — LAB (OUTPATIENT)
Dept: LAB | Facility: CLINIC | Age: 80
End: 2025-03-04
Payer: MEDICARE

## 2025-03-04 DIAGNOSIS — D64.9 LOW HEMOGLOBIN: ICD-10-CM

## 2025-03-04 LAB
FERRITIN SERPL-MCNC: 43 NG/ML (ref 11–328)
HGB BLD-MCNC: 12.3 G/DL (ref 11.7–15.7)
IRON BINDING CAPACITY (ROCHE): 301 UG/DL (ref 240–430)
IRON SATN MFR SERPL: 28 % (ref 15–46)
IRON SERPL-MCNC: 85 UG/DL (ref 37–145)

## 2025-03-04 PROCEDURE — 85018 HEMOGLOBIN: CPT

## 2025-03-04 PROCEDURE — 83540 ASSAY OF IRON: CPT

## 2025-03-04 PROCEDURE — 82728 ASSAY OF FERRITIN: CPT

## 2025-03-04 PROCEDURE — 83550 IRON BINDING TEST: CPT

## 2025-03-04 PROCEDURE — 36415 COLL VENOUS BLD VENIPUNCTURE: CPT

## 2025-03-05 ENCOUNTER — MYC MEDICAL ADVICE (OUTPATIENT)
Dept: FAMILY MEDICINE | Facility: CLINIC | Age: 80
End: 2025-03-05
Payer: MEDICARE

## 2025-03-05 NOTE — RESULT ENCOUNTER NOTE
Your hemoglobin is improved and the iron is better but not at goal.  Have you been taking the iron and are the legs better?    Marco Jones M.D.

## 2025-03-08 ENCOUNTER — MYC MEDICAL ADVICE (OUTPATIENT)
Dept: FAMILY MEDICINE | Facility: CLINIC | Age: 80
End: 2025-03-08
Payer: MEDICARE

## 2025-03-08 DIAGNOSIS — G25.81 RESTLESS LEGS SYNDROME (RLS): Primary | ICD-10-CM

## 2025-03-10 RX ORDER — GABAPENTIN 300 MG/1
300 CAPSULE ORAL DAILY
Qty: 90 CAPSULE | Refills: 1 | Status: SHIPPED | OUTPATIENT
Start: 2025-03-10

## 2025-03-10 NOTE — PROGRESS NOTES
The patient presents for atraumatic low back pain that has been going on for 3-5 weeks.  She does have a history of low back pain before.  She notes that the pain is in the low back.  She has been able to sleep.  At times she gets stabs of pain and at times it has radiated but not lately.  No GI or  symptoms.  No fevers or night sweats.  No loss of control over bowels or bladder.  No leg tingling or numbness.  She is seeing Vladimir Bui for physical therapy and she notes that she is gradually improving.  She is using Tylenol or Advil.She wonders if she needs an MRI.    Past Medical History:   Diagnosis Date     Adenomatous polyp of colon 2002    fu done 2011 and to fu 2014, fu done 2014 and to fu 2019     Hypercholesteraemia      Migraines      Osteopenia 2005    fu 2009 better, -0.4 spine and -1.1 fem neck, has fu with gyn     PONV (postoperative nausea and vomiting)      Screening 2005    abd us neg for aaa due to fh     Spinal stenosis     on neurontin     Spondylolistheses      Tonsillar abscess 1992     Vitamin D deficiency 2014     Past Surgical History:   Procedure Laterality Date     breast biopsies      twice     HYSTERECTOMY, PAP NO LONGER INDICATED  1998    due to hyperplasia     right knee arthroscopy  1998     right knee [place]  2016    [place]     ROTATOR CUFF REPAIR RT/LT  2007     STRIP VEIN  2010     Social History     Socioeconomic History     Marital status:      Spouse name: Not on file     Number of children: 3     Years of education: Not on file     Highest education level: Not on file   Social Needs     Financial resource strain: Not on file     Food insecurity - worry: Not on file     Food insecurity - inability: Not on file     Transportation needs - medical: Not on file     Transportation needs - non-medical: Not on file   Occupational History     Not on file   Tobacco Use     Smoking status: Never Smoker     Smokeless tobacco: Never Used   Substance and Sexual Activity      "Alcohol use: Yes     Alcohol/week: 4.2 oz     Types: 7 Standard drinks or equivalent per week     Drug use: No     Sexual activity: Yes     Partners: Male   Other Topics Concern     Parent/sibling w/ CABG, MI or angioplasty before 65F 55M? Not Asked   Social History Narrative     Not on file     Current Outpatient Medications   Medication Sig Dispense Refill     Calcium Carbonate-Vitamin D (CALCIUM + D PO) Take 1 capsule by mouth daily        conjugated estrogens (PREMARIN) vaginal cream Place 1 g vaginally twice a week       gabapentin (NEURONTIN) 300 MG capsule TAKE 1 CAPSULE THREE TIMES A  capsule 3     SUMAtriptan (IMITREX) 50 MG tablet Take 1 tablet (50 mg) by mouth at onset of headache for migraine May repeat in 2 hours. Max 4 tablets/24 hours. 9 tablet 3     Allergies   Allergen Reactions     Nickel      Succinylcholine      Gets paralyzed     FAMILY HISTORY NOTED AND REVIEWED    REVIEW OF SYSTEMS: above    PHYSICAL EXAM    /75 (BP Location: Right arm, Patient Position: Sitting, Cuff Size: Adult Regular)   Pulse 61   Temp 97.9  F (36.6  C) (Oral)   Ht 1.702 m (5' 7\")   Wt 67.1 kg (148 lb)   SpO2 98%   Breastfeeding? No   BMI 23.18 kg/m      Patient appears non toxic  Gait within normal limits  Gets on and off table without help  slr neg  Cms intact in legs  No back tenderness  Abdomen normal active bowel sounds, soft non-tender    ASSESSMENT:  Low back pain, suspect msk, doubt tumor, fx, infection, cord syndrome, gi or genitourinary issue    As better continue current care.  If worsens or not resolving soon call and do ls spine mri    Marco Jones M.D.        " 185.42 Pt c/o dental pain

## 2025-03-10 NOTE — TELEPHONE ENCOUNTER
To PCP:    Per mychart encounter dated 01/16/2025, Patient to change Gabapentin dosage to once daily 300 mg for restless legs.    Medication and pharmacy pended for review.    Angela Ladd RN  [facility]

## 2025-04-23 ENCOUNTER — NURSE TRIAGE (OUTPATIENT)
Dept: FAMILY MEDICINE | Facility: CLINIC | Age: 80
End: 2025-04-23
Payer: MEDICARE

## 2025-04-23 NOTE — TELEPHONE ENCOUNTER
Nurse Triage SBAR    Is this a 2nd Level Triage? NO    Situation: Called patient regarding her [employer] message. She stated that she has been having right shoulder pain, bilateral knee pain, neck pain, and bilateral foot pain. She stated that this has been going on for the past 6 months or so.     Background: She is currently seeing PT to help with the shoulder pain. She just started and have not noticed any improvement yet.     Assessment: See below    Protocol Recommended Disposition:   See in Office Within 3 Days    Recommendation: Patient is wondering if there is other medication such as Celebrex or Meloxicam or anything that the provider recommends. Pharmacy is pended if needed.    Routed to provider    Does the patient meet one of the following criteria for ADS visit consideration? 16+ years old, with an [facility] PCP     TIP  Providers, please consider if this condition is appropriate for management at one of our Acute and Diagnostic Services sites.     If patient is a good candidate, please use dotphrase <dot>triageresponse and select Refer to ADS to document.    Reason for Disposition   MODERATE pain (e.g., interferes with normal activities) and present > 3 days    Additional Information   Negative: Difficulty breathing or unusual sweating (e.g., sweating without exertion)   Negative: Pain lasting > 5 minutes and pain also present in chest  (Exception: Pain is clearly made worse by movement.)   Negative: Age > 40 and no obvious cause and pain even when not moving the arm  (Exception: Pain is clearly made worse by moving arm or bending neck.)   Negative: Red area or streak and fever   Negative: Swollen joint and fever   Negative: Entire arm is swollen   Negative: Patient sounds very sick or weak to the triager   Negative: Shock suspected (e.g., cold/pale/clammy skin, too weak to stand, low BP, rapid pulse)   Negative: Similar pain previously and it was from 'heart attack'   Negative: Similar pain previously and  "it was from 'angina' and not relieved by nitroglycerin   Negative: Sounds like a life-threatening emergency to the triager   Negative: Chest pain   Negative: Followed an injury to shoulder   Negative: SEVERE pain (e.g., excruciating, unable to do any normal activities)   Negative: Shoulder pains with exertion (e.g., walking) and pain goes away on resting and not present now   Negative: Patient wants to be seen   Negative: Painful rash with multiple small blisters grouped together (i.e., dermatomal distribution or 'band' or 'stripe')   Negative: Looks like a boil, infected sore, deep ulcer or other infected rash (spreading redness, pus)   Negative: Localized rash is very painful (no fever)   Negative: Weakness (i.e., loss of strength) in hand or fingers  (Exception: Not truly weak; hand feels weak because of pain.)   Negative: Numbness (i.e., loss of sensation) in hand or fingers   Negative: Unable to use arm at all and because of shoulder pain or stiffness    Answer Assessment - Initial Assessment Questions  1. ONSET: \"When did the pain start?\"        6 months    2. LOCATION: \"Where is the pain located?\"        Shoulder right     3. PAIN: \"How bad is the pain?\" (Scale 1-10; or mild, moderate, severe)        Sitting still 0/10 but moving a certain way it is about moderate pain     4. WORK OR EXERCISE: \"Has there been any recent work or exercise that involved this part of the body?\"        No    5. CAUSE: \"What do you think is causing the shoulder pain?\"        Arthritis    6. OTHER SYMPTOMS: \"Do you have any other symptoms?\" (e.g., neck pain, swelling, rash, fever, numbness, weakness)        Neck pain, bilateral knee pain, feet pain    7. PREGNANCY: \"Is there any chance you are pregnant?\" \"When was your last menstrual period?\"        No    Protocols used: [facility] Triage Team    "

## 2025-04-23 NOTE — TELEPHONE ENCOUNTER
Possibly but she really needs an in person visit to evaluate this with myself or team.    Thank you    Marco Jones M.D.

## 2025-04-23 NOTE — TELEPHONE ENCOUNTER
Attempt #1: Left detailed Message    Writer left message for patient requesting that they return call to discuss message below.

## 2025-04-24 ENCOUNTER — VIRTUAL VISIT (OUTPATIENT)
Dept: FAMILY MEDICINE | Facility: CLINIC | Age: 80
End: 2025-04-24
Payer: MEDICARE

## 2025-04-24 DIAGNOSIS — M25.50 MULTIPLE JOINT PAIN: Primary | ICD-10-CM

## 2025-04-24 RX ORDER — CELECOXIB 50 MG/1
50 CAPSULE ORAL 2 TIMES DAILY
Qty: 60 CAPSULE | Refills: 1 | Status: SHIPPED | OUTPATIENT
Start: 2025-04-24

## 2025-04-24 NOTE — PROGRESS NOTES
Chayo is a 80 year old who is being evaluated via a billable video visit.    How would you like to obtain your AVS? MyChart  If the video visit is dropped, the invitation should be resent by: Text to cell phone: [phone]  Will anyone else be joining your video visit? No      Assessment & Plan     Multiple joint pain  Chronic pain. Working with PT already. Sent celebrex to try. Discussed trying to use sparingly. Can also use Voltaren and Tylenol. Follow-up prn   - celecoxib (CELEBREX) 50 MG capsule; Take 1 capsule (50 mg) by mouth 2 times daily.        Subjective   Chayo is a 80 year old, presenting for the following health issues:  Pain      Video Start Time: 5:13 PM    History of Present Illness       Reason for visit:  Med request   She is taking medications regularly.        Has a lot of arthritis pain that waxes and wanes   This is not disabling.   Doing PT for shoulder   Had back surgery this last fall   Also PT for a weak leg after a knee replacement   Also has pain in left foot and now starting in right           Review of Systems  Detailed as above         Objective           Vitals:  No vitals were obtained today due to virtual visit.    Physical Exam   GENERAL: alert and no distress  EYES: Eyes grossly normal to inspection.  No discharge or erythema, or obvious scleral/conjunctival abnormalities.  RESP: No audible wheeze, cough, or visible cyanosis.    SKIN: Visible skin clear. No significant rash, abnormal pigmentation or lesions.  NEURO: Cranial nerves grossly intact.  Mentation and speech appropriate for age.  PSYCH: Appropriate affect, tone, and pace of words          Video-Visit Details    Type of service:  Video Visit   Video End Time:5:23 PM  Originating Location (pt. Location): Home    Distant Location (provider location):  On-site  Platform used for Video Visit: Doximkarolyn  Signed Electronically by: CAMILA Melgar CNP

## 2025-05-13 ENCOUNTER — TRANSFERRED RECORDS (OUTPATIENT)
Dept: ADMINISTRATIVE | Facility: CLINIC | Age: 80
End: 2025-05-13
Payer: MEDICARE

## 2025-05-14 ENCOUNTER — RESULTS FOLLOW-UP (OUTPATIENT)
Dept: GASTROENTEROLOGY | Facility: CLINIC | Age: 80
End: 2025-05-14

## 2025-05-14 NOTE — PROCEDURES
[facility]   [address], [address], [place] [address]     Patient Name: Rebecca Prieto  Gender:  Female  Exam Date: 05/13/2025 Visit Number:  [id number]  Age: 80 Years YOB: 1945  Attending MD: Jyothi Ruby MD Medical Record#:  [id number]    Procedure: Colonoscopy   Indications: Colorectal cancer screening      Referring MD: Referral Self  Primary MD:      Marco Jones MD  Medications: Admitting Medications:   0.9% Normal Saline at TKO   Intra Procedure Medications:   Patient received monitored anesthesia care.     Complications: No immediate complications  ______________________________________________________________________________  Procedure:   An examination of the heart and lungs was performed and found to be within acceptable limits.  .  The patient was therefore deemed a reasonable candidate for endoscopy and sedation.   The risks and benefits of the procedure were explained to the patient.After obtaining informed consent, the patient received monitored anesthesia care and I passed the scope   without difficulty via the rectum to the ileum.  The appendiceal orifice and ic valve were identified.  The quality of the prep was good  (Miralax/Gatorade/2 tablets Bisacodyl/Magnesium Citrate).    This was a complete examination throughout the entire colon.    Findings:    Polyp location: rectum.  Quantity: 1.  Size: 1 mm.  Polyp shape:  sessile.         Maneuver: polypectomy was performed with a cold biopsy forceps.       Removal:  complete.  Retrieval: complete.  Bleeding: none.    Polyp location: transverse colon.  Quantity: 1.  Size: 3 mm.  Polyp shape: sessile.         Maneuver: polypectomy was performed with a  cold snare.       Removal: complete.  Retrieval: complete.  Bleeding: none.    Polyp location: descending colon.  Quantity: 1.  Size: 3 mm.   Polyp shape: sessile.         Maneuver: polypectomy was performed with a cold snare.       Removal:  complete.  Retrieval: complete.  Bleeding: none.      Polyp location: ascending colon.  Quantity: 1.   Size: 1 mm.  Polyp shape: sessile.         Maneuver: polypectomy was performed with a  cold biopsy forceps.       Removal: complete.  Retrieval: complete.  Bleeding: none.     Diverticulosis.  Location: - sigmoid.        Quantity:  several.  No inflammation present.    Impression:  Colorectal polyps  Diverticulosis of colon without diverticulitis    Preliminary Plan:  The patient and their physician will receive a copy of the pathology report as well as pathology-based recommendations for future screening or surveillance.  Pathology Results:  A: COLON, ASCENDING, POLYP:           1. Tubular adenoma           2. Negative for high grade dysplasia           3. Per the colonoscopy report:               a. Polyp size: 1 mm               b. Resection: Complete               c. Retrieval: Complete      B: COLON, TRANSVERSE, POLYP:           1. Tubular adenoma           2. Negative for high grade dysplasia           3. Per the colonoscopy report:               a. Polyp size: 3 mm               b. Resection: Complete               c. Retrieval: Complete      C: COLON, DESCENDING, POLYP:           1. Sessile serrated adenoma           2. Negative for overt dysplasia           3. Per the colonoscopy report:               a. Polyp size: 3 mm               b. Resection: Complete               c. Retrieval: Complete      D: RECTUM, POLYP:           1. Tubular adenoma           2. Negative for high grade dysplasia           3. Per the colonoscopy report:               a. Polyp size: 1 mm               b. Resection: Complete               c. Retrieval: Complete      MICROSCOPIC  A: Performed   B: Performed   C: Performed   D: Performed     Electronically signed by: Bolivar Alejandro MD    Interpreted at [facility], [address] [place] [address], [place]  [address]    Orders    Instruction(s)/Education:  Instruction/Education Timeframe Assessment   Colon Cancer Prevention  K63.5   Colon Polyps  K63.5   Diverticulosis/Diverticulitis  K63.5   High Fiber Diet  K63.5       Final Plan:  Repeat colonoscopy in 3 years.    We will attempt to contact you at appropriate intervals via [place] mail.  We may not be able to find you or contact you at that time, therefore you should know that the responsibility for following our recommendation rests with you.  If you don't hear from us at the time your procedure is due, please contact our office to schedule an appointment.  If your contact information should change, please contact our office so that we can update your record.      _Electronically signed by:___________________  Jyothi Ruby MD                 05/13/2025    cc: Marco Jones MD  cc: Marco Jones MD  cc: Sandra Rios MD

## 2025-07-20 DIAGNOSIS — M25.50 MULTIPLE JOINT PAIN: ICD-10-CM

## 2025-07-21 RX ORDER — CELECOXIB 50 MG/1
50 CAPSULE ORAL 2 TIMES DAILY
Qty: 60 CAPSULE | Refills: 1 | Status: SHIPPED | OUTPATIENT
Start: 2025-07-21